# Patient Record
Sex: FEMALE | Race: OTHER | Employment: UNEMPLOYED | URBAN - METROPOLITAN AREA
[De-identification: names, ages, dates, MRNs, and addresses within clinical notes are randomized per-mention and may not be internally consistent; named-entity substitution may affect disease eponyms.]

---

## 2018-03-08 ENCOUNTER — HOSPITAL ENCOUNTER (OUTPATIENT)
Age: 38
Discharge: HOME | End: 2018-03-08
Attending: FAMILY MEDICINE
Payer: COMMERCIAL

## 2018-03-08 VITALS
TEMPERATURE: 98 F | HEART RATE: 83 BPM | RESPIRATION RATE: 18 BRPM | OXYGEN SATURATION: 97 % | WEIGHT: 180 LBS | DIASTOLIC BLOOD PRESSURE: 73 MMHG | HEIGHT: 62 IN | BODY MASS INDEX: 33.13 KG/M2 | SYSTOLIC BLOOD PRESSURE: 131 MMHG

## 2018-03-08 DIAGNOSIS — T78.40XA ALLERGIC REACTION, INITIAL ENCOUNTER: Primary | ICD-10-CM

## 2018-03-08 PROCEDURE — 99203 OFFICE O/P NEW LOW 30 MIN: CPT

## 2018-03-08 PROCEDURE — 99204 OFFICE O/P NEW MOD 45 MIN: CPT

## 2018-03-08 RX ORDER — FEXOFENADINE HCL 180 MG/1
180 TABLET ORAL DAILY
Qty: 7 TABLET | Refills: 0 | Status: SHIPPED | OUTPATIENT
Start: 2018-03-08 | End: 2018-03-15

## 2018-03-08 NOTE — ED PROVIDER NOTES
Patient Seen in: [facility] Immediate Care In [address] [place] [address]    History   Patient presents with:  Rash Skin Problem (integumentary)    Stated Complaint: allergic reaction     HPI    Patient here with slightly red rash over the face. Mildly itchy.   Note Neck supple. No JVD present. Cardiovascular: Normal rate, regular rhythm, normal heart sounds and intact distal pulses. Exam reveals no gallop and no friction rub. No murmur heard. Pulmonary/Chest: Effort normal and breath sounds normal. No stridor.

## 2018-03-08 NOTE — ED INITIAL ASSESSMENT (HPI)
Face became red and itchy since yesterday no new products/make up or creams took nothing for itchiness. Easy non labored resp speech clear.

## 2018-03-08 NOTE — ED NOTES
Avoid new creams soaps perfume use dye free soap. Call and follow up with pcp in office 3 days if worse. Call pcp and follow up in ed for shortness of breath fever increased redness.

## 2018-03-09 ENCOUNTER — OFFICE VISIT (OUTPATIENT)
Dept: FAMILY MEDICINE CLINIC | Facility: CLINIC | Age: 38
End: 2018-03-09

## 2018-03-09 VITALS
HEART RATE: 80 BPM | HEIGHT: 62 IN | SYSTOLIC BLOOD PRESSURE: 109 MMHG | WEIGHT: 185 LBS | DIASTOLIC BLOOD PRESSURE: 72 MMHG | BODY MASS INDEX: 34.04 KG/M2

## 2018-03-09 DIAGNOSIS — T78.40XD ALLERGIC REACTION, SUBSEQUENT ENCOUNTER: Primary | ICD-10-CM

## 2018-03-09 PROCEDURE — 99213 OFFICE O/P EST LOW 20 MIN: CPT | Performed by: NURSE PRACTITIONER

## 2018-03-09 RX ORDER — PREDNISONE 20 MG/1
TABLET ORAL
Qty: 15 TABLET | Refills: 0 | Status: SHIPPED | OUTPATIENT
Start: 2018-03-09 | End: 2018-05-02

## 2018-03-09 RX ORDER — HYDROXYZINE HYDROCHLORIDE 25 MG/1
25 TABLET, FILM COATED ORAL 3 TIMES DAILY PRN
Qty: 15 TABLET | Refills: 0 | Status: SHIPPED | OUTPATIENT
Start: 2018-03-09 | End: 2018-03-14

## 2018-03-09 NOTE — PATIENT INSTRUCTIONS
Reacción alérgica por la picadura de un insecto, generalizada  Usted tiene lili reacción alérgica causada por la picadura de un insecto. [place] puede suceder si lo ha rocio lili avispa, lili [place], lili véspula u otro insecto.  Provoca lili erupción cutánea (sa · La difenhidramina oral es un antihistamínico de venta akiko disponible en farmacias y supermercados. A menos que le hayan recetado un antihistamínico, la difenhidramina puede usarse para reducir la comezón si grandes zonas de la piel están afectadas.  Pue · Tire hacia arriba, utilizando incluso, presión firme. No tire o tuerza la garrapata. Los fluídos del organismo de la garrapata pueden contener organismos causantes de infección. Así que no estruje, aplaste o agujeree el cuerpo de la garrapata.  No utilice · Para prevenir lili infección, no rasque las zonas afectadas. Siempre controle la ashley picada para matthew signos de infección: aumento del enrojecimiento, hinchazón o dolor en la ashley afectada.   Evite futuras [address] ser peor Programe lili visita de control con cabrera proveedor de atención médica o según le indicaron si los síntomas no mejoran.   Llame al 911  Llame al 911 si ocurre algo de lo siguiente:  · Problemas para respirar o tragar, respiración sibilante  · Pile pálida, humed Lana reacción alérgica es un conjunto de síntomas causados por un alérgeno. Ijeoma es lana sustancia que hace reaccionar al sistema inmunitario de [facility] persona.  Cuando karel persona entra en contacto con un alérgeno, se desencadena lana reacción que hace que el cu · Las actividades en las que participó. · Los productos nuevos que usó o las cosas nuevas con las que entró en contacto. Las siguientes son algunas causas comunes, krista recuerde que reji cualquier cosa puede causar lili reacción.  Y es posible que usted ni El objetivo de nuestro tratamiento es [employer] síntomas y que usted se sienta mejor. El sarpullido por lo general va disminuyendo después de unos cuantos días, krista, en ocasiones, puede durar [facility].  En los [address], [address] julee mo · Para ayudar a prevenir lili infección, no se rasque la ashley afectada. Umbarger puede empeorar la reacción y dañar cabrera piel y llevar a lili infección. Examine siempre la ashley afectada para detectar signos de infección.   · Llame a cabrera proveedor de [employer] · Agrandamiento de las zonas de comezón, enrojecimiento o hinchazón  · Náuseas o cólicos estomacales, o dolor abdominal  · Continuidad o repetición de los síntomas  · Expansión de las zonas de enrojecimiento, hinchazón o comezón  · Signos de infección en e · Alimentos: huevos, cacahuates (maníes), [place] de ruby, nueces, soya, pescado, otf, chícharos (arvejas) y mariscos son los más comunes. · Fármacos: penicilina, aspirina, antiinflamatorios no esteroides (CAIO), anestesia.   · Picaduras y mordeduras de in · Retire y lave con [place] las prendas que puedan julee estado en contacto con los aceites de la planta venenosa (u otra sustancia que pueda julee causado la reacción). Limpie cabrera calzado con alcohol para fricciones si tuvo contacto con el alérgeno. Si tiene síntomas de anafilaxis y tiene un autoinyector de epinefrina, úselo inmediatamente y llame al 911. Si no tiene un autoinyector, llame al 911.  Acuéstese con los pies levantados por encima del nivel de cabrera corazón.     Guía de acción [employer]  Los

## 2018-03-12 PROBLEM — T78.40XA ALLERGIC REACTION: Status: ACTIVE | Noted: 2018-03-12

## 2018-03-12 NOTE — PROGRESS NOTES
HPI  Pt presents for f/u UC yesterday for rash on face. No known new exposure to allergen. Was told to take allegra but rash is worsening-is a burning, itchy feeling on face. Is spreading to itchy welts on chest, back and extremities.   States similar occu Take 1 tablet (180 mg total) by mouth daily. Disp: 7 tablet Rfl: 0       Allergies:  No Known Allergies    Physical Exam   Constitutional: She is oriented to person, place, and time. She appears well-developed and well-nourished. No distress.    HENT:   Daya Glover predniSONE 20 MG Oral Tab          Sig: Take 3 tablets once a day for 5 days          Dispense:  15 tablet          Refill:  0      HydrOXYzine HCl 25 MG Oral Tab          Sig: Take 1 tablet (25 mg total) by mouth 3 (three) times daily as needed for [place]

## 2018-03-19 ENCOUNTER — NURSE ONLY (OUTPATIENT)
Dept: ALLERGY | Facility: CLINIC | Age: 38
End: 2018-03-19

## 2018-03-19 ENCOUNTER — OFFICE VISIT (OUTPATIENT)
Dept: ALLERGY | Facility: CLINIC | Age: 38
End: 2018-03-19

## 2018-03-19 VITALS
DIASTOLIC BLOOD PRESSURE: 90 MMHG | OXYGEN SATURATION: 98 % | HEIGHT: 62 IN | HEART RATE: 74 BPM | WEIGHT: 178 LBS | SYSTOLIC BLOOD PRESSURE: 122 MMHG | TEMPERATURE: 99 F | BODY MASS INDEX: 32.76 KG/M2 | RESPIRATION RATE: 16 BRPM

## 2018-03-19 DIAGNOSIS — R21 FACIAL RASH: Primary | ICD-10-CM

## 2018-03-19 DIAGNOSIS — T78.40XA ALLERGIC REACTION, INITIAL ENCOUNTER: ICD-10-CM

## 2018-03-19 PROCEDURE — 99212 OFFICE O/P EST SF 10 MIN: CPT | Performed by: ALLERGY & IMMUNOLOGY

## 2018-03-19 PROCEDURE — 95024 IQ TESTS W/ALLERGENIC XTRCS: CPT | Performed by: ALLERGY & IMMUNOLOGY

## 2018-03-19 PROCEDURE — 95004 PERQ TESTS W/ALRGNC XTRCS: CPT | Performed by: ALLERGY & IMMUNOLOGY

## 2018-03-19 PROCEDURE — 99204 OFFICE O/P NEW MOD 45 MIN: CPT | Performed by: ALLERGY & IMMUNOLOGY

## 2018-03-19 RX ORDER — LEVOCETIRIZINE DIHYDROCHLORIDE 5 MG/1
5 TABLET, FILM COATED ORAL NIGHTLY
Qty: 30 TABLET | Refills: 0 | Status: SHIPPED | OUTPATIENT
Start: 2018-03-19 | End: 2018-05-02

## 2018-03-19 RX ORDER — HYDROXYZINE HYDROCHLORIDE 25 MG/1
25 TABLET, FILM COATED ORAL 3 TIMES DAILY PRN
COMMUNITY
End: 2018-05-02

## 2018-03-19 NOTE — PATIENT INSTRUCTIONS
Recs: Handouts on allergic reactions provided and reviewed  Handouts on allergies and avoidance measures provided and reviewed including the potential treatment option of immunotherapy  Recommend Zyrtec 10 mg or Xyzal 5 mg once a night at bedtime along

## 2018-03-19 NOTE — PROGRESS NOTES
Ryne Lopez is a 40year old female. HPI:   Patient presents with: Allergies: Allergic Reaction. Went to ER 3/8/18. She c/o generalized itching and rash. She also c/o increase mucous production prior to allergic reaction.       Patient is a Allergies:  No Known Allergies      ROS:     Allergic/Immuno:  See HPI  Cardiovascular:  Negative for irregular heartbeat/palpitations, chest pain, edema  Constitutional:  Negative night sweats,weight loss, irritability and lethargy  Endocrine:  Allegra Baum face.  No preceding foods, meds,   No preceding fever or infections    Symptoms resolved with previous treatment with Benadryl Medrol and Atarax    Skin testing today to environmental allergens to screen for allergic triggers was + to mold      Recs: Rosa Maria

## 2018-05-02 ENCOUNTER — OFFICE VISIT (OUTPATIENT)
Dept: FAMILY MEDICINE CLINIC | Facility: CLINIC | Age: 38
End: 2018-05-02

## 2018-05-02 ENCOUNTER — HOSPITAL ENCOUNTER (OUTPATIENT)
Dept: GENERAL RADIOLOGY | Age: 38
Discharge: HOME | End: 2018-05-02
Attending: FAMILY MEDICINE
Payer: COMMERCIAL

## 2018-05-02 VITALS
SYSTOLIC BLOOD PRESSURE: 111 MMHG | WEIGHT: 184 LBS | BODY MASS INDEX: 34 KG/M2 | TEMPERATURE: 98 F | HEART RATE: 73 BPM | DIASTOLIC BLOOD PRESSURE: 75 MMHG

## 2018-05-02 DIAGNOSIS — J30.1 SEASONAL ALLERGIC RHINITIS DUE TO POLLEN: ICD-10-CM

## 2018-05-02 DIAGNOSIS — M25.562 ACUTE PAIN OF LEFT KNEE: Primary | ICD-10-CM

## 2018-05-02 DIAGNOSIS — M25.562 ACUTE PAIN OF LEFT KNEE: ICD-10-CM

## 2018-05-02 PROCEDURE — 73564 X-RAY EXAM KNEE 4 OR MORE: CPT | Performed by: FAMILY MEDICINE

## 2018-05-02 PROCEDURE — 99214 OFFICE O/P EST MOD 30 MIN: CPT | Performed by: FAMILY MEDICINE

## 2018-05-02 PROCEDURE — 99212 OFFICE O/P EST SF 10 MIN: CPT | Performed by: FAMILY MEDICINE

## 2018-05-02 RX ORDER — NAPROXEN SODIUM 550 MG/1
550 TABLET ORAL 2 TIMES DAILY WITH MEALS
Qty: 60 TABLET | Refills: 1 | Status: SHIPPED | OUTPATIENT
Start: 2018-05-02 | End: 2019-01-23

## 2018-05-02 RX ORDER — FLUTICASONE PROPIONATE 50 MCG
2 SPRAY, SUSPENSION (ML) NASAL DAILY
Qty: 1 INHALER | Refills: 3 | Status: SHIPPED | OUTPATIENT
Start: 2018-05-02 | End: 2019-01-23

## 2018-05-02 NOTE — PROGRESS NOTES
5/2/2018  11:53 AM    Yessenia Chatterjee is a 40year old female.     Chief complaint(s): Patient presents with:  Knee Pain: pt c/o left knee pain X 2 week  Leg Pain: pt c/o left leg pain X 2 weeks    HPI:     Yessenia Chatterjee primary complaint is reg Allergies      ROS:   Review of Systems   Constitutional: Negative for chills, fatigue and fever. HENT: Positive for rhinorrhea, sinus pressure and sneezing. Negative for ear pain and sore throat. Respiratory: Negative for cough and wheezing.     Katie Schaefer Fluticasone Propionate 50 MCG/ACT Nasal Suspension 1 Inhaler 3      Si sprays by Each Nare route daily.          REFERRALS: 1PHYSICAL THERAPY - INTERNAL,       PHYSICAL THERAPY - at [address] [place] [address], COMPLETE (4 OR MORE VIEWS), LEFT (CPT=7

## 2018-11-19 ENCOUNTER — APPOINTMENT (OUTPATIENT)
Dept: GENERAL RADIOLOGY | Age: 38
End: 2018-11-19
Attending: EMERGENCY MEDICINE
Payer: COMMERCIAL

## 2018-11-19 ENCOUNTER — HOSPITAL ENCOUNTER (OUTPATIENT)
Age: 38
Discharge: HOME | End: 2018-11-19
Attending: EMERGENCY MEDICINE
Payer: COMMERCIAL

## 2018-11-19 VITALS
WEIGHT: 188 LBS | SYSTOLIC BLOOD PRESSURE: 116 MMHG | DIASTOLIC BLOOD PRESSURE: 73 MMHG | BODY MASS INDEX: 34 KG/M2 | TEMPERATURE: 98 F | OXYGEN SATURATION: 100 % | RESPIRATION RATE: 16 BRPM | HEART RATE: 76 BPM

## 2018-11-19 DIAGNOSIS — M79.601 PAIN OF RIGHT UPPER EXTREMITY: ICD-10-CM

## 2018-11-19 DIAGNOSIS — N64.4 BREAST PAIN: Primary | ICD-10-CM

## 2018-11-19 PROCEDURE — 93005 ELECTROCARDIOGRAM TRACING: CPT

## 2018-11-19 PROCEDURE — 93010 ELECTROCARDIOGRAM REPORT: CPT | Performed by: EMERGENCY MEDICINE

## 2018-11-19 PROCEDURE — 93010 ELECTROCARDIOGRAM REPORT: CPT

## 2018-11-19 PROCEDURE — 99214 OFFICE O/P EST MOD 30 MIN: CPT

## 2018-11-19 PROCEDURE — 71046 X-RAY EXAM CHEST 2 VIEWS: CPT | Performed by: EMERGENCY MEDICINE

## 2018-11-19 NOTE — ED INITIAL ASSESSMENT (HPI)
Pt reports bilateral breast pain and bilateral leg pain x one week. Denies chest pain or shortness of breath.

## 2018-11-19 NOTE — ED PROVIDER NOTES
Patient Seen in: [facility] Immediate Care In [address] [place] [address]    History   Patient presents with:  Pain (neurologic)    Stated Complaint: breast pain    HPI    44 yo female with pain in both breasts. Pain worse with palpation. No difficulty breathing.  No She exhibits no distension and no mass. There is no tenderness. There is no rebound and no guarding. Musculoskeletal: Normal range of motion. She exhibits no edema or tenderness. Lymphadenopathy:     She has no cervical adenopathy.    Neurological: She

## 2018-11-26 ENCOUNTER — APPOINTMENT (OUTPATIENT)
Dept: LAB | Age: 38
End: 2018-11-26
Attending: FAMILY MEDICINE
Payer: COMMERCIAL

## 2018-11-26 ENCOUNTER — OFFICE VISIT (OUTPATIENT)
Dept: FAMILY MEDICINE CLINIC | Facility: CLINIC | Age: 38
End: 2018-11-26

## 2018-11-26 VITALS
DIASTOLIC BLOOD PRESSURE: 76 MMHG | BODY MASS INDEX: 36.98 KG/M2 | HEART RATE: 103 BPM | HEIGHT: 60 IN | SYSTOLIC BLOOD PRESSURE: 107 MMHG | WEIGHT: 188.38 LBS

## 2018-11-26 DIAGNOSIS — M79.10 MYALGIA: ICD-10-CM

## 2018-11-26 DIAGNOSIS — R20.2 PARESTHESIA: ICD-10-CM

## 2018-11-26 DIAGNOSIS — M79.10 MYALGIA: Primary | ICD-10-CM

## 2018-11-26 PROCEDURE — 99212 OFFICE O/P EST SF 10 MIN: CPT | Performed by: FAMILY MEDICINE

## 2018-11-26 PROCEDURE — 99214 OFFICE O/P EST MOD 30 MIN: CPT | Performed by: FAMILY MEDICINE

## 2018-11-26 PROCEDURE — 86431 RHEUMATOID FACTOR QUANT: CPT | Performed by: FAMILY MEDICINE

## 2018-11-26 PROCEDURE — 86038 ANTINUCLEAR ANTIBODIES: CPT

## 2018-11-26 PROCEDURE — 86140 C-REACTIVE PROTEIN: CPT | Performed by: FAMILY MEDICINE

## 2018-11-26 PROCEDURE — 85652 RBC SED RATE AUTOMATED: CPT | Performed by: FAMILY MEDICINE

## 2018-11-26 PROCEDURE — 36415 COLL VENOUS BLD VENIPUNCTURE: CPT

## 2018-11-26 RX ORDER — MELOXICAM 15 MG/1
15 TABLET ORAL DAILY
Qty: 90 TABLET | Refills: 1 | Status: SHIPPED | OUTPATIENT
Start: 2018-11-26 | End: 2019-08-13 | Stop reason: ALTCHOICE

## 2018-11-26 NOTE — PROGRESS NOTES
11/26/2018  1:09 PM    Alia Cummings is a 45year old female. Chief complaint(s): Patient presents with:  Pain: Pain bilateral arms, legs, and breasts.    Test Results: Chest xray    HPI:     Alia Cummings primary complaint is regarding as pain and sore throat. Respiratory: Negative for cough and wheezing. Cardiovascular: Negative for chest pain and palpitations. Gastrointestinal: Negative for nausea, vomiting, abdominal pain, diarrhea and constipation.    Musculoskeletal: Positive fo 11/19/2018 at 13:35             ASSESSMENT/PLAN:   Assessment   Myalgia  (primary encounter diagnosis)  Paresthesia    DDX:  OA, SLE, RA, CTS, Fibromyalgia    MEDICATIONS:  •  Meloxicam 15 MG Oral Tab, Take 1 tablet (15 mg total) by mouth daily. , Disp: 90

## 2019-01-23 ENCOUNTER — LAB ENCOUNTER (OUTPATIENT)
Dept: LAB | Age: 39
End: 2019-01-23
Attending: FAMILY MEDICINE
Payer: COMMERCIAL

## 2019-01-23 ENCOUNTER — APPOINTMENT (OUTPATIENT)
Dept: LAB | Age: 39
End: 2019-01-23
Attending: FAMILY MEDICINE
Payer: COMMERCIAL

## 2019-01-23 ENCOUNTER — OFFICE VISIT (OUTPATIENT)
Dept: FAMILY MEDICINE CLINIC | Facility: CLINIC | Age: 39
End: 2019-01-23

## 2019-01-23 VITALS
BODY MASS INDEX: 37.6 KG/M2 | HEIGHT: 59.5 IN | SYSTOLIC BLOOD PRESSURE: 109 MMHG | WEIGHT: 189 LBS | DIASTOLIC BLOOD PRESSURE: 72 MMHG | HEART RATE: 68 BPM | TEMPERATURE: 98 F

## 2019-01-23 DIAGNOSIS — D25.9 UTERINE LEIOMYOMA, UNSPECIFIED LOCATION: ICD-10-CM

## 2019-01-23 DIAGNOSIS — Z00.00 PHYSICAL EXAM: ICD-10-CM

## 2019-01-23 DIAGNOSIS — N63.0 BREAST MASS IN FEMALE: ICD-10-CM

## 2019-01-23 DIAGNOSIS — Z00.00 PHYSICAL EXAM: Primary | ICD-10-CM

## 2019-01-23 LAB
ALBUMIN SERPL BCP-MCNC: 3.8 G/DL (ref 3.5–4.8)
ALBUMIN/GLOB SERPL: 1.3 {RATIO} (ref 1–2)
ALP SERPL-CCNC: 68 U/L (ref 32–100)
ALT SERPL-CCNC: 37 U/L (ref 14–54)
ANION GAP SERPL CALC-SCNC: 9 MMOL/L (ref 0–18)
AST SERPL-CCNC: 25 U/L (ref 15–41)
BACTERIA UR QL AUTO: NEGATIVE /HPF
BACTERIA UR QL AUTO: NEGATIVE /HPF
BASOPHILS # BLD: 0.1 K/UL (ref 0–0.2)
BASOPHILS NFR BLD: 1 %
BILIRUB SERPL-MCNC: 0.4 MG/DL (ref 0.3–1.2)
BILIRUB UR QL: NEGATIVE
BUN SERPL-MCNC: 9 MG/DL (ref 8–20)
BUN/CREAT SERPL: 15.3 (ref 10–20)
CALCIUM SERPL-MCNC: 9.2 MG/DL (ref 8.5–10.5)
CANCER AG125 SERPL-ACNC: 10 U/ML (ref 0–35)
CANCER AG125 SERPL-ACNC: 7.1 U/ML (ref ?–35)
CHLORIDE SERPL-SCNC: 103 MMOL/L (ref 95–110)
CHOLEST SERPL-MCNC: 139 MG/DL (ref 110–200)
CLARITY UR: CLEAR
CO2 SERPL-SCNC: 23 MMOL/L (ref 22–32)
COLOR UR: YELLOW
CREAT SERPL-MCNC: 0.59 MG/DL (ref 0.5–1.5)
EOSINOPHIL # BLD: 0.2 K/UL (ref 0–0.7)
EOSINOPHIL NFR BLD: 2 %
ERYTHROCYTE [DISTWIDTH] IN BLOOD BY AUTOMATED COUNT: 14.5 % (ref 11–15)
EST. AVERAGE GLUCOSE BLD GHB EST-MCNC: 131 MG/DL (ref 68–126)
GLOBULIN PLAS-MCNC: 3 G/DL (ref 2.5–3.7)
GLUCOSE SERPL-MCNC: 106 MG/DL (ref 70–99)
GLUCOSE UR-MCNC: NEGATIVE MG/DL
HBA1C MFR BLD HPLC: 6.2 % (ref ?–5.7)
HCT VFR BLD AUTO: 39.3 % (ref 35–48)
HDLC SERPL-MCNC: 32 MG/DL
HGB BLD-MCNC: 12.7 G/DL (ref 12–16)
KETONES UR-MCNC: NEGATIVE MG/DL
LDLC SERPL CALC-MCNC: 82 MG/DL (ref 0–99)
LEUKOCYTE ESTERASE UR QL STRIP.AUTO: NEGATIVE
LYMPHOCYTES # BLD: 2.6 K/UL (ref 1–4)
LYMPHOCYTES NFR BLD: 25 %
MCH RBC QN AUTO: 24.3 PG (ref 27–32)
MCHC RBC AUTO-ENTMCNC: 32.2 G/DL (ref 32–37)
MCV RBC AUTO: 75.4 FL (ref 80–100)
MONOCYTES # BLD: 0.5 K/UL (ref 0–1)
MONOCYTES NFR BLD: 5 %
NEUTROPHILS # BLD AUTO: 7.2 K/UL (ref 1.8–7.7)
NEUTROPHILS NFR BLD: 68 %
NITRITE UR QL STRIP.AUTO: NEGATIVE
NONHDLC SERPL-MCNC: 107 MG/DL
OSMOLALITY UR CALC.SUM OF ELEC: 279 MOSM/KG (ref 275–295)
PATIENT FASTING: YES
PH UR: 5 [PH] (ref 5–8)
PLATELET # BLD AUTO: 323 K/UL (ref 140–400)
PMV BLD AUTO: 9.1 FL (ref 7.4–10.3)
POTASSIUM SERPL-SCNC: 4.3 MMOL/L (ref 3.3–5.1)
PROT SERPL-MCNC: 6.8 G/DL (ref 5.9–8.4)
PROT UR-MCNC: NEGATIVE MG/DL
RBC # BLD AUTO: 5.22 M/UL (ref 3.7–5.4)
RBC #/AREA URNS AUTO: 0 /HPF
RBC #/AREA URNS AUTO: 1 /HPF
SODIUM SERPL-SCNC: 135 MMOL/L (ref 136–144)
SP GR UR STRIP: 1.02 (ref 1–1.03)
TRIGL SERPL-MCNC: 123 MG/DL (ref 1–149)
TSH SERPL-ACNC: 2.44 UIU/ML (ref 0.45–5.33)
UROBILINOGEN UR STRIP-ACNC: <2
VIT C UR-MCNC: NEGATIVE MG/DL
WBC # BLD AUTO: 10.6 K/UL (ref 4–11)
WBC #/AREA URNS AUTO: 1 /HPF
WBC #/AREA URNS AUTO: <1 /HPF

## 2019-01-23 PROCEDURE — 86304 IMMUNOASSAY TUMOR CA 125: CPT

## 2019-01-23 PROCEDURE — 83036 HEMOGLOBIN GLYCOSYLATED A1C: CPT

## 2019-01-23 PROCEDURE — 81001 URINALYSIS AUTO W/SCOPE: CPT | Performed by: FAMILY MEDICINE

## 2019-01-23 PROCEDURE — 90471 IMMUNIZATION ADMIN: CPT | Performed by: FAMILY MEDICINE

## 2019-01-23 PROCEDURE — 84443 ASSAY THYROID STIM HORMONE: CPT

## 2019-01-23 PROCEDURE — 36415 COLL VENOUS BLD VENIPUNCTURE: CPT

## 2019-01-23 PROCEDURE — 90715 TDAP VACCINE 7 YRS/> IM: CPT | Performed by: FAMILY MEDICINE

## 2019-01-23 PROCEDURE — 82306 VITAMIN D 25 HYDROXY: CPT | Performed by: FAMILY MEDICINE

## 2019-01-23 PROCEDURE — 85025 COMPLETE CBC W/AUTO DIFF WBC: CPT

## 2019-01-23 PROCEDURE — 80053 COMPREHEN METABOLIC PANEL: CPT

## 2019-01-23 PROCEDURE — 93005 ELECTROCARDIOGRAM TRACING: CPT

## 2019-01-23 PROCEDURE — 93010 ELECTROCARDIOGRAM REPORT: CPT | Performed by: FAMILY MEDICINE

## 2019-01-23 PROCEDURE — 81015 MICROSCOPIC EXAM OF URINE: CPT | Performed by: FAMILY MEDICINE

## 2019-01-23 PROCEDURE — 80061 LIPID PANEL: CPT

## 2019-01-23 PROCEDURE — 99395 PREV VISIT EST AGE 18-39: CPT | Performed by: FAMILY MEDICINE

## 2019-01-23 NOTE — PROGRESS NOTES
1/23/2019  8:32 AM    Parish Pedraza is a 45year old female. Chief complaint(s): Patient presents with:  Routine Physical    HPI:     Parish Pedraza primary complaint is regarding CPE.      Parish Pedraza is a 45year old female presen tinnitus. Eyes: Negative for visual disturbance. Respiratory: Negative for apnea, cough, chest tightness, shortness of breath and wheezing. Cardiovascular: Negative for chest pain, palpitations and leg swelling.    Gastrointestinal: Negative for hea change. Lungs clear to auscultation bilaterally. Abdominal: Soft. Genitourinary: Uterus is enlarged. Genitourinary Comments: GYN pelvic exam: Normal external genitalia, without lesions or condyloma.   There is no vaginal discharge , cervix with no m TDAP.    RECOMMENDATIONS given include: ANTICIPATORY GUIDANCE  topics covered today include: safety (i.e. seat belts, helmets, sunscreen, protective sports gear ), nutrition (i.e. healthy meals and snacks (i.e. avoid junk food and high-carbohydrate foods); YEARS, IM USE  US PELVIS (TRANSABDOMINAL PELVIS)  ([id number])         Madhuri Donohue MD

## 2019-01-24 LAB
C TRACH DNA SPEC QL NAA+PROBE: NEGATIVE
N GONORRHOEA DNA SPEC QL NAA+PROBE: NEGATIVE

## 2019-01-25 LAB
25(OH)D3 SERPL-MCNC: 23.3 NG/ML (ref 30–100)
HPV I/H RISK 1 DNA SPEC QL NAA+PROBE: NEGATIVE

## 2019-01-25 RX ORDER — ERGOCALCIFEROL 1.25 MG/1
50000 CAPSULE ORAL WEEKLY
Qty: 12 CAPSULE | Refills: 4 | Status: SHIPPED | OUTPATIENT
Start: 2019-01-25 | End: 2019-02-24

## 2019-03-26 ENCOUNTER — HOSPITAL ENCOUNTER (OUTPATIENT)
Dept: ULTRASOUND IMAGING | Age: 39
Discharge: HOME | End: 2019-03-26
Attending: FAMILY MEDICINE
Payer: COMMERCIAL

## 2019-03-26 DIAGNOSIS — D25.9 UTERINE LEIOMYOMA, UNSPECIFIED LOCATION: Primary | ICD-10-CM

## 2019-03-26 DIAGNOSIS — D25.9 UTERINE LEIOMYOMA, UNSPECIFIED LOCATION: ICD-10-CM

## 2019-03-26 PROCEDURE — 76856 US EXAM PELVIC COMPLETE: CPT | Performed by: FAMILY MEDICINE

## 2019-03-26 PROCEDURE — 76830 TRANSVAGINAL US NON-OB: CPT | Performed by: FAMILY MEDICINE

## 2019-05-02 ENCOUNTER — OFFICE VISIT (OUTPATIENT)
Dept: OBGYN CLINIC | Facility: CLINIC | Age: 39
End: 2019-05-02

## 2019-05-02 VITALS
HEIGHT: 59.5 IN | BODY MASS INDEX: 38.2 KG/M2 | DIASTOLIC BLOOD PRESSURE: 68 MMHG | SYSTOLIC BLOOD PRESSURE: 112 MMHG | WEIGHT: 192 LBS

## 2019-05-02 DIAGNOSIS — N89.8 VAGINAL DISCHARGE: Primary | ICD-10-CM

## 2019-05-02 DIAGNOSIS — Z11.3 SCREENING EXAMINATION FOR STD (SEXUALLY TRANSMITTED DISEASE): ICD-10-CM

## 2019-05-02 DIAGNOSIS — N84.0 ENDOMETRIAL POLYP: ICD-10-CM

## 2019-05-02 DIAGNOSIS — N64.4 MASTALGIA: ICD-10-CM

## 2019-05-02 DIAGNOSIS — N92.6 IRREGULAR MENSES: ICD-10-CM

## 2019-05-02 PROCEDURE — 99244 OFF/OP CNSLTJ NEW/EST MOD 40: CPT | Performed by: OBSTETRICS & GYNECOLOGY

## 2019-05-02 RX ORDER — ERGOCALCIFEROL 1.25 MG/1
CAPSULE ORAL
Refills: 4 | COMMUNITY
Start: 2019-04-23 | End: 2020-10-29 | Stop reason: ALTCHOICE

## 2019-05-02 NOTE — PROGRESS NOTES
HPI:   Deisy Barboza is a 45year old female who presents for a hx of vaginal itching and foul vaginal discharge.    Hx of pelvic u/s mar 26 2019 at [facility], poss polyp or submucosal fibroid with repeat u/s advised by radiology, u/s was done for [facility] exertion  CARDIOVASCULAR: denies chest pain on exertion  GI: denies abdominal pain,denies heartburn  : denies dysuria, vaginal discharge or itching,periods regular   MUSCULOSKELETAL: denies back pain  NEURO: denies headaches  PSYCHE: denies depression or F/u office 1-2 weeks. Pt in exam room/seen/cousneled for approx 40 min, greater than 50 percent of time spent in face to face counseling. . Self breast exam explained. Health maintenancePt' s weight is Body mass index is 38.13 kg/m². , recommended

## 2019-05-08 ENCOUNTER — TELEPHONE (OUTPATIENT)
Dept: OBGYN CLINIC | Facility: CLINIC | Age: 39
End: 2019-05-08

## 2019-05-08 PROBLEM — N64.4 MASTALGIA: Status: ACTIVE | Noted: 2019-05-08

## 2019-05-08 PROBLEM — N84.0 ENDOMETRIAL POLYP: Status: ACTIVE | Noted: 2019-05-08

## 2019-05-08 NOTE — TELEPHONE ENCOUNTER
Please inform   Diagnostic mammogram ( for mastalgia) order and pelvic ultrasound (for hx of possible endometrial polyps)  In the computer,  Pt to make appointment for both on the same day,  F/u in office 1 week. Please help pt schedule these.

## 2019-05-13 ENCOUNTER — TELEPHONE (OUTPATIENT)
Dept: OBGYN CLINIC | Facility: CLINIC | Age: 39
End: 2019-05-13

## 2019-05-13 NOTE — TELEPHONE ENCOUNTER
----- Message from Tavo Palacios MD sent at 5/10/2019  9:56 PM CDT -----  Normal genital screen and normal gc/chl

## 2019-05-21 ENCOUNTER — HOSPITAL ENCOUNTER (OUTPATIENT)
Dept: ULTRASOUND IMAGING | Facility: HOSPITAL | Age: 39
Discharge: HOME | End: 2019-05-21
Attending: OBSTETRICS & GYNECOLOGY
Payer: COMMERCIAL

## 2019-05-21 ENCOUNTER — HOSPITAL ENCOUNTER (OUTPATIENT)
Dept: MAMMOGRAPHY | Facility: HOSPITAL | Age: 39
Discharge: HOME | End: 2019-05-21
Attending: OBSTETRICS & GYNECOLOGY
Payer: COMMERCIAL

## 2019-05-21 DIAGNOSIS — N64.4 MASTALGIA: ICD-10-CM

## 2019-05-21 DIAGNOSIS — N84.0 ENDOMETRIAL POLYP: ICD-10-CM

## 2019-05-21 PROCEDURE — 76856 US EXAM PELVIC COMPLETE: CPT | Performed by: OBSTETRICS & GYNECOLOGY

## 2019-05-21 PROCEDURE — 77066 DX MAMMO INCL CAD BI: CPT | Performed by: OBSTETRICS & GYNECOLOGY

## 2019-05-21 PROCEDURE — 77062 BREAST TOMOSYNTHESIS BI: CPT | Performed by: OBSTETRICS & GYNECOLOGY

## 2019-05-21 PROCEDURE — 76642 ULTRASOUND BREAST LIMITED: CPT | Performed by: OBSTETRICS & GYNECOLOGY

## 2019-05-21 PROCEDURE — 76830 TRANSVAGINAL US NON-OB: CPT | Performed by: OBSTETRICS & GYNECOLOGY

## 2019-05-28 ENCOUNTER — TELEPHONE (OUTPATIENT)
Dept: OBGYN CLINIC | Facility: CLINIC | Age: 39
End: 2019-05-28

## 2019-05-28 NOTE — TELEPHONE ENCOUNTER
Call was picked up but no answer       ----- Message from Jerome Frost MD sent at 5/28/2019  9:59 AM CDT -----  Benign findings/multiple bilateral breast cysts noted, hx of mastodynia,  Please inform and schedule f/u exam 1 week for further clinical

## 2019-05-29 ENCOUNTER — TELEPHONE (OUTPATIENT)
Dept: OBGYN CLINIC | Facility: CLINIC | Age: 39
End: 2019-05-29

## 2019-05-29 NOTE — TELEPHONE ENCOUNTER
Pt has follow up visit scheduled for 05/30/19 at 12:40 pm with asj--    --- Message from Fernie Ayala MD sent at 5/28/2019  9:59 AM CDT -----  Benign findings/multiple bilateral breast cysts noted, hx of mastodynia,  Please inform and schedule f/u e

## 2019-05-30 ENCOUNTER — OFFICE VISIT (OUTPATIENT)
Dept: OBGYN CLINIC | Facility: CLINIC | Age: 39
End: 2019-05-30

## 2019-05-30 VITALS — SYSTOLIC BLOOD PRESSURE: 110 MMHG | BODY MASS INDEX: 38 KG/M2 | DIASTOLIC BLOOD PRESSURE: 64 MMHG | WEIGHT: 192 LBS

## 2019-05-30 DIAGNOSIS — N60.01 BILATERAL BREAST CYSTS: ICD-10-CM

## 2019-05-30 DIAGNOSIS — N64.4 MASTALGIA IN FEMALE: Primary | ICD-10-CM

## 2019-05-30 DIAGNOSIS — N64.4 MASTODYNIA: ICD-10-CM

## 2019-05-30 DIAGNOSIS — N60.02 BILATERAL BREAST CYSTS: ICD-10-CM

## 2019-05-30 PROCEDURE — 99214 OFFICE O/P EST MOD 30 MIN: CPT | Performed by: OBSTETRICS & GYNECOLOGY

## 2019-05-30 NOTE — PROGRESS NOTES
HPI:   Ryne Lopez is a 45year old female who presents for a f/u exam for mastalgia and for recent pelvic u/s needing f/u. Pt referred to dr Ligia Quarles for her bilateral breast cysts and mastalgia,  Pt given number and will call his office today. hx of anemia  ENDOCRINE: denies thyroid history  ALL/ASTHMA: denies hx of allergy or asthma    EXAM:   /64   Wt 192 lb (87.1 kg)   LMP 05/03/2019   BMI 38.13 kg/m²   Body mass index is 38.13 kg/m².    GENERAL: well developed, well nourished,in no appa

## 2019-06-02 ENCOUNTER — APPOINTMENT (OUTPATIENT)
Dept: GENERAL RADIOLOGY | Facility: HOSPITAL | Age: 39
End: 2019-06-02
Attending: EMERGENCY MEDICINE
Payer: COMMERCIAL

## 2019-06-02 ENCOUNTER — HOSPITAL ENCOUNTER (EMERGENCY)
Facility: HOSPITAL | Age: 39
Discharge: HOME | End: 2019-06-02
Attending: EMERGENCY MEDICINE
Payer: COMMERCIAL

## 2019-06-02 VITALS
TEMPERATURE: 98 F | WEIGHT: 192 LBS | RESPIRATION RATE: 16 BRPM | OXYGEN SATURATION: 98 % | BODY MASS INDEX: 38 KG/M2 | HEART RATE: 80 BPM | DIASTOLIC BLOOD PRESSURE: 71 MMHG | SYSTOLIC BLOOD PRESSURE: 117 MMHG

## 2019-06-02 DIAGNOSIS — R20.2 PARESTHESIA OF LEFT ARM: Primary | ICD-10-CM

## 2019-06-02 PROCEDURE — 99283 EMERGENCY DEPT VISIT LOW MDM: CPT

## 2019-06-02 PROCEDURE — 93005 ELECTROCARDIOGRAM TRACING: CPT

## 2019-06-02 PROCEDURE — 71046 X-RAY EXAM CHEST 2 VIEWS: CPT | Performed by: EMERGENCY MEDICINE

## 2019-06-02 PROCEDURE — 93010 ELECTROCARDIOGRAM REPORT: CPT | Performed by: EMERGENCY MEDICINE

## 2019-06-03 NOTE — ED PROVIDER NOTES
Patient Seen in: [facility] Emergency Department    History   Patient presents with:  Numbness Weakness (neurologic)    Stated Complaint: left arm pain    HPI    Patient is a 43-year-old female who presents to the emergency department with a chief Left wrist: She exhibits no effusion. Arms:  Neurological: She is alert and oriented to person, place, and time. She has normal strength. No sensory deficit. Skin: Skin is warm and dry. No rash noted. Nursing note and vitals reviewed.

## 2019-06-06 PROBLEM — N60.01 BILATERAL BREAST CYSTS: Status: ACTIVE | Noted: 2019-06-06

## 2019-06-06 PROBLEM — N60.02 BILATERAL BREAST CYSTS: Status: ACTIVE | Noted: 2019-06-06

## 2019-06-18 ENCOUNTER — TELEPHONE (OUTPATIENT)
Dept: FAMILY MEDICINE CLINIC | Facility: CLINIC | Age: 39
End: 2019-06-18

## 2019-07-15 ENCOUNTER — TELEPHONE (OUTPATIENT)
Dept: FAMILY MEDICINE CLINIC | Facility: CLINIC | Age: 39
End: 2019-07-15

## 2019-08-13 ENCOUNTER — OFFICE VISIT (OUTPATIENT)
Dept: FAMILY MEDICINE CLINIC | Facility: CLINIC | Age: 39
End: 2019-08-13

## 2019-08-13 VITALS
SYSTOLIC BLOOD PRESSURE: 98 MMHG | HEIGHT: 60 IN | BODY MASS INDEX: 36.64 KG/M2 | DIASTOLIC BLOOD PRESSURE: 66 MMHG | HEART RATE: 67 BPM | TEMPERATURE: 98 F | WEIGHT: 186.63 LBS

## 2019-08-13 DIAGNOSIS — J02.9 SORE THROAT: Primary | ICD-10-CM

## 2019-08-13 LAB
CONTROL LINE PRESENT WITH A CLEAR BACKGROUND (YES/NO): YES YES/NO
KIT LOT #: NORMAL NUMERIC
STREP GRP A CUL-SCR: NEGATIVE

## 2019-08-13 PROCEDURE — 87880 STREP A ASSAY W/OPTIC: CPT | Performed by: FAMILY MEDICINE

## 2019-08-13 PROCEDURE — 99213 OFFICE O/P EST LOW 20 MIN: CPT | Performed by: FAMILY MEDICINE

## 2019-08-13 RX ORDER — IBUPROFEN 600 MG/1
600 TABLET ORAL EVERY 6 HOURS PRN
Qty: 60 TABLET | Refills: 0 | Status: SHIPPED | OUTPATIENT
Start: 2019-08-13 | End: 2019-08-13

## 2019-08-13 RX ORDER — METAXALONE 800 MG/1
800 TABLET ORAL 3 TIMES DAILY
Qty: 30 TABLET | Refills: 1 | Status: SHIPPED | OUTPATIENT
Start: 2019-08-13 | End: 2019-08-13

## 2019-08-13 NOTE — PROGRESS NOTES
2019 3:58 PM    Yessenia Chatterjee, : 1980  Patient presents with:  Sore Throat: c/o trouble swallowing x 1 month  Vision Problem: c/o trouble adjusting to light when stepping outdoors    HPI:     Yessenia Chatterjee is a 44year old female Laterality Date   •           • TUBAL LIGATION         Social History: Social History    Socioeconomic History      Marital status:       Spouse name: Not on file      Number of children: Not on file      Years of education: Not on Exam:     Physical Examination:    BP 98/66 (BP Location: Right arm, Patient Position: Sitting, Cuff Size: large)   Pulse 67   Temp 97.8 °F (36.6 °C) (Oral)   Ht 5' (1.524 m)   Wt 186 lb 9.6 oz (84.6 kg)   LMP 08/11/2019 (LMP Unknown)   BMI 36.44 kg/m² [84031]      Follow Up with:  No follow-up provider specified. Beltran Woodruff MD    All results reviewed and discussed with patient. See AVS for detailed discharge instructions provided for today's visit / condition .

## 2020-01-10 ENCOUNTER — OFFICE VISIT (OUTPATIENT)
Dept: FAMILY MEDICINE CLINIC | Facility: CLINIC | Age: 40
End: 2020-01-10

## 2020-01-10 VITALS
TEMPERATURE: 98 F | WEIGHT: 190 LBS | DIASTOLIC BLOOD PRESSURE: 77 MMHG | HEIGHT: 60 IN | HEART RATE: 76 BPM | SYSTOLIC BLOOD PRESSURE: 113 MMHG | BODY MASS INDEX: 37.3 KG/M2

## 2020-01-10 DIAGNOSIS — G58.9 MONONEUROPATHY: Primary | ICD-10-CM

## 2020-01-10 DIAGNOSIS — R51.9 HEADACHE DISORDER: ICD-10-CM

## 2020-01-10 PROCEDURE — 99213 OFFICE O/P EST LOW 20 MIN: CPT | Performed by: FAMILY MEDICINE

## 2020-01-10 RX ORDER — MOMETASONE FUROATE 1 MG/G
CREAM TOPICAL
Qty: 30 G | Refills: 1 | Status: SHIPPED | OUTPATIENT
Start: 2020-01-10 | End: 2020-10-29 | Stop reason: ALTCHOICE

## 2020-01-10 RX ORDER — IBUPROFEN 600 MG/1
600 TABLET ORAL EVERY 6 HOURS PRN
Qty: 60 TABLET | Refills: 0 | Status: SHIPPED | OUTPATIENT
Start: 2020-01-10 | End: 2021-01-21

## 2020-01-10 RX ORDER — L-METHYLFOLATE-ALGAE-VIT B12-B6 CAP 3-90.314-2-35 MG 3-90.314-2-35 MG
2 CAP ORAL DAILY
Qty: 180 CAPSULE | Refills: 3 | Status: SHIPPED | OUTPATIENT
Start: 2020-01-10 | End: 2020-02-09

## 2020-01-10 NOTE — PROGRESS NOTES
1/10/2020  11:56 AM    Parish Pedraza is a 44year old female.     Chief complaint(s): Patient presents with:  Headache: right side of head, fatigue and drowsy   Derm Problem: c/o dark red spots on her lower legs     HPI:     Parish Pedraza prim capsules by mouth daily. 180 capsule 3   • Mometasone Furoate 0.1 % External Cream Apply to affected area(s) BID 30 g 1   • ibuprofen 600 MG Oral Tab Take 1 tablet (600 mg total) by mouth every 6 (six) hours as needed for Pain. Always take it with food.  [facility] Prescriptions     Signed Prescriptions Disp Refills   • L-Methylfolate-Algae-B12-B6 (METANX) 4-93.275-0-01 MG Oral Cap 180 capsule 3     Sig: Take 2 capsules by mouth daily.    • Mometasone Furoate 0.1 % External Cream 30 g 1     Sig: Apply to affected area Jose Aguiar MD

## 2020-01-15 ENCOUNTER — TELEPHONE (OUTPATIENT)
Dept: FAMILY MEDICINE CLINIC | Facility: CLINIC | Age: 40
End: 2020-01-15

## 2020-01-15 RX ORDER — AZITHROMYCIN 250 MG/1
TABLET, FILM COATED ORAL
Qty: 6 TABLET | Refills: 0 | Status: SHIPPED | OUTPATIENT
Start: 2020-01-15 | End: 2020-01-24 | Stop reason: ALTCHOICE

## 2020-01-15 NOTE — TELEPHONE ENCOUNTER
Language Line # [phone]     Spoke with patient--report headache of 10/10 \"all over my whole head--it's stronger now. My eyes hurt and I can't stand it. \" Patient denies nausea, vomiting or dizziness at this time.     Contacted Dr. Camryn Cao for recommendatio

## 2020-01-15 NOTE — TELEPHONE ENCOUNTER
Pharmacy     [address] [place] [address], [address], [phone], [phone]   Outpatient Medication Detail      Disp Refills Start End    azithromycin (ZITHROMAX Z-ELIZABETH) 250 MG Oral Tab 6 tablet 0

## 2020-01-24 ENCOUNTER — LAB ENCOUNTER (OUTPATIENT)
Dept: LAB | Age: 40
End: 2020-01-24
Attending: FAMILY MEDICINE
Payer: COMMERCIAL

## 2020-01-24 ENCOUNTER — OFFICE VISIT (OUTPATIENT)
Dept: FAMILY MEDICINE CLINIC | Facility: CLINIC | Age: 40
End: 2020-01-24

## 2020-01-24 VITALS
HEIGHT: 60 IN | TEMPERATURE: 99 F | DIASTOLIC BLOOD PRESSURE: 69 MMHG | WEIGHT: 188 LBS | BODY MASS INDEX: 36.91 KG/M2 | HEART RATE: 61 BPM | SYSTOLIC BLOOD PRESSURE: 109 MMHG

## 2020-01-24 DIAGNOSIS — Z00.00 PHYSICAL EXAM: Primary | ICD-10-CM

## 2020-01-24 DIAGNOSIS — Z12.39 BREAST CANCER SCREENING: ICD-10-CM

## 2020-01-24 DIAGNOSIS — Z00.00 PHYSICAL EXAM: ICD-10-CM

## 2020-01-24 LAB
25(OH)D3 SERPL-MCNC: 33.4 NG/ML (ref 30–100)
ALBUMIN SERPL-MCNC: 3.5 G/DL (ref 3.4–5)
ALBUMIN/GLOB SERPL: 0.9 {RATIO} (ref 1–2)
ALP LIVER SERPL-CCNC: 70 U/L (ref 37–98)
ALT SERPL-CCNC: 53 U/L (ref 13–56)
ANION GAP SERPL CALC-SCNC: 5 MMOL/L (ref 0–18)
AST SERPL-CCNC: 23 U/L (ref 15–37)
BACTERIA UR QL AUTO: NEGATIVE /HPF
BACTERIA UR QL AUTO: NEGATIVE /HPF
BASOPHILS # BLD AUTO: 0.05 X10(3) UL (ref 0–0.2)
BASOPHILS NFR BLD AUTO: 0.5 %
BILIRUB SERPL-MCNC: 0.3 MG/DL (ref 0.1–2)
BILIRUB UR QL: NEGATIVE
BUN BLD-MCNC: 11 MG/DL (ref 7–18)
BUN/CREAT SERPL: 16.7 (ref 10–20)
CALCIUM BLD-MCNC: 8.5 MG/DL (ref 8.5–10.1)
CHLORIDE SERPL-SCNC: 107 MMOL/L (ref 98–112)
CHOLEST SMN-MCNC: 130 MG/DL (ref ?–200)
CLARITY UR: CLEAR
CO2 SERPL-SCNC: 28 MMOL/L (ref 21–32)
COLOR UR: YELLOW
CREAT BLD-MCNC: 0.66 MG/DL (ref 0.55–1.02)
DEPRECATED RDW RBC AUTO: 39.8 FL (ref 35.1–46.3)
EOSINOPHIL # BLD AUTO: 0.17 X10(3) UL (ref 0–0.7)
EOSINOPHIL NFR BLD AUTO: 1.6 %
ERYTHROCYTE [DISTWIDTH] IN BLOOD BY AUTOMATED COUNT: 14.2 % (ref 11–15)
EST. AVERAGE GLUCOSE BLD GHB EST-MCNC: 148 MG/DL (ref 68–126)
GLOBULIN PLAS-MCNC: 3.8 G/DL (ref 2.8–4.4)
GLUCOSE BLD-MCNC: 100 MG/DL (ref 70–99)
GLUCOSE UR-MCNC: NEGATIVE MG/DL
HBA1C MFR BLD HPLC: 6.8 % (ref ?–5.7)
HCT VFR BLD AUTO: 40.6 % (ref 35–48)
HDLC SERPL-MCNC: 25 MG/DL (ref 40–59)
HGB BLD-MCNC: 12.9 G/DL (ref 12–16)
HGB UR QL STRIP.AUTO: NEGATIVE
IMM GRANULOCYTES # BLD AUTO: 0.06 X10(3) UL (ref 0–1)
IMM GRANULOCYTES NFR BLD: 0.6 %
KETONES UR-MCNC: NEGATIVE MG/DL
LDLC SERPL CALC-MCNC: 72 MG/DL (ref ?–100)
LEUKOCYTE ESTERASE UR QL STRIP.AUTO: NEGATIVE
LYMPHOCYTES # BLD AUTO: 3.29 X10(3) UL (ref 1–4)
LYMPHOCYTES NFR BLD AUTO: 30.5 %
M PROTEIN MFR SERPL ELPH: 7.3 G/DL (ref 6.4–8.2)
MCH RBC QN AUTO: 24.8 PG (ref 26–34)
MCHC RBC AUTO-ENTMCNC: 31.8 G/DL (ref 31–37)
MCV RBC AUTO: 77.9 FL (ref 80–100)
MONOCYTES # BLD AUTO: 0.43 X10(3) UL (ref 0.1–1)
MONOCYTES NFR BLD AUTO: 4 %
NEUTROPHILS # BLD AUTO: 6.78 X10 (3) UL (ref 1.5–7.7)
NEUTROPHILS # BLD AUTO: 6.78 X10(3) UL (ref 1.5–7.7)
NEUTROPHILS NFR BLD AUTO: 62.8 %
NITRITE UR QL STRIP.AUTO: NEGATIVE
NONHDLC SERPL-MCNC: 105 MG/DL (ref ?–130)
OSMOLALITY SERPL CALC.SUM OF ELEC: 289 MOSM/KG (ref 275–295)
PATIENT FASTING Y/N/NP: YES
PATIENT FASTING Y/N/NP: YES
PH UR: 5 [PH] (ref 5–8)
PLATELET # BLD AUTO: 363 10(3)UL (ref 150–450)
POTASSIUM SERPL-SCNC: 4.1 MMOL/L (ref 3.5–5.1)
PROT UR-MCNC: NEGATIVE MG/DL
RBC # BLD AUTO: 5.21 X10(6)UL (ref 3.8–5.3)
RBC #/AREA URNS AUTO: 1 /HPF
RBC #/AREA URNS AUTO: 1 /HPF
SODIUM SERPL-SCNC: 140 MMOL/L (ref 136–145)
SP GR UR STRIP: 1.02 (ref 1–1.03)
TRIGL SERPL-MCNC: 165 MG/DL (ref 30–149)
TSI SER-ACNC: 2.54 MIU/ML (ref 0.36–3.74)
UROBILINOGEN UR STRIP-ACNC: <2
VLDLC SERPL CALC-MCNC: 33 MG/DL (ref 0–30)
WBC # BLD AUTO: 10.8 X10(3) UL (ref 4–11)
WBC #/AREA URNS AUTO: 2 /HPF
WBC #/AREA URNS AUTO: 2 /HPF

## 2020-01-24 PROCEDURE — 90686 IIV4 VACC NO PRSV 0.5 ML IM: CPT | Performed by: FAMILY MEDICINE

## 2020-01-24 PROCEDURE — 84443 ASSAY THYROID STIM HORMONE: CPT

## 2020-01-24 PROCEDURE — 90471 IMMUNIZATION ADMIN: CPT | Performed by: FAMILY MEDICINE

## 2020-01-24 PROCEDURE — 36415 COLL VENOUS BLD VENIPUNCTURE: CPT

## 2020-01-24 PROCEDURE — 80061 LIPID PANEL: CPT

## 2020-01-24 PROCEDURE — 81001 URINALYSIS AUTO W/SCOPE: CPT | Performed by: FAMILY MEDICINE

## 2020-01-24 PROCEDURE — 85025 COMPLETE CBC W/AUTO DIFF WBC: CPT

## 2020-01-24 PROCEDURE — 81015 MICROSCOPIC EXAM OF URINE: CPT | Performed by: FAMILY MEDICINE

## 2020-01-24 PROCEDURE — 99395 PREV VISIT EST AGE 18-39: CPT | Performed by: FAMILY MEDICINE

## 2020-01-24 PROCEDURE — 83036 HEMOGLOBIN GLYCOSYLATED A1C: CPT

## 2020-01-24 PROCEDURE — 80053 COMPREHEN METABOLIC PANEL: CPT

## 2020-01-24 PROCEDURE — 82306 VITAMIN D 25 HYDROXY: CPT | Performed by: FAMILY MEDICINE

## 2020-01-24 RX ORDER — BENZONATATE 200 MG/1
200 CAPSULE ORAL 3 TIMES DAILY PRN
Qty: 30 CAPSULE | Refills: 0 | Status: SHIPPED | OUTPATIENT
Start: 2020-01-24 | End: 2020-10-29

## 2020-01-24 NOTE — PROGRESS NOTES
1/24/2020  9:46 AM    Milena Garcia is a 44year old female.     Chief complaint(s): Patient presents with:  Routine Physical: Normal Pap Smear last done 1/23/19; Normal Mammo done 2019; Pt is fasting    HPI:     Milena Garcia primary complain daily as needed. 30 capsule 0   • Mometasone Furoate 0.1 % External Cream Apply to affected area(s) BID 30 g 1   • ibuprofen 600 MG Oral Tab Take 1 tablet (600 mg total) by mouth every 6 (six) hours as needed for Pain. Always take it with food.  60 tablet 0 throat clear without lesions or exudate and normal tonsils. Neck: Neck supple. No JVD present. No thyromegaly present. Cardiovascular: Normal rate, regular rhythm and S2 normal.  Exam reveals no gallop and no friction rub. No murmur heard.   Pulmonar include: safety (i.e. seat belts, helmets, sunscreen, protective sports gear ), nutrition (i.e. healthy meals and snacks (i.e. avoid junk food and high-carbohydrate foods); athletic conditioning, fluids; low fat milk, limit to less than 20 oz. a day; denta

## 2020-01-30 ENCOUNTER — OFFICE VISIT (OUTPATIENT)
Dept: FAMILY MEDICINE CLINIC | Facility: CLINIC | Age: 40
End: 2020-01-30

## 2020-01-30 VITALS
HEIGHT: 60 IN | DIASTOLIC BLOOD PRESSURE: 72 MMHG | BODY MASS INDEX: 36.91 KG/M2 | SYSTOLIC BLOOD PRESSURE: 113 MMHG | HEART RATE: 80 BPM | WEIGHT: 188 LBS | TEMPERATURE: 98 F

## 2020-01-30 DIAGNOSIS — Z79.4 TYPE 2 DIABETES MELLITUS WITHOUT COMPLICATION, WITH LONG-TERM CURRENT USE OF INSULIN (HCC): Primary | ICD-10-CM

## 2020-01-30 DIAGNOSIS — E11.9 TYPE 2 DIABETES MELLITUS WITHOUT COMPLICATION, WITH LONG-TERM CURRENT USE OF INSULIN (HCC): Primary | ICD-10-CM

## 2020-01-30 PROCEDURE — 99214 OFFICE O/P EST MOD 30 MIN: CPT | Performed by: FAMILY MEDICINE

## 2020-01-30 NOTE — PROGRESS NOTES
1/30/2020  1:21 PM    Jos Avery is a 44year old female. Chief complaint(s): Patient presents with:  Test Results: discuss recent abn A1c     HPI:     Jos Avery primary complaint is regarding new onset diabetes.      Patient Wilma Whittington Medications   Medication Sig Dispense Refill   • benzonatate 200 MG Oral Cap Take 1 capsule (200 mg total) by mouth 3 (three) times daily as needed.  30 capsule 0   • L-Methylfolate-Algae-B12-B6 (METANX) 3-90.314-2-35 MG Oral Cap Take 2 capsules by mouth da 11 7 - 18 mg/dL    Creatinine 0.66 0.55 - 1.02 mg/dL    BUN/CREA Ratio 16.7 10.0 - 20.0    Calcium, Total 8.5 8.5 - 10.1 mg/dL    Calculated Osmolality 289 275 - 295 mOsm/kg    GFR, Non- 112 >=60    GFR, -American 129 >=60    ALT 53 Absolute Prelim 6.78 1.50 - 7.70 x10 (3) uL    Neutrophil Absolute 6.78 1.50 - 7.70 x10(3) uL    Lymphocyte Absolute 3.29 1.00 - 4.00 x10(3) uL    Monocyte Absolute 0.43 0.10 - 1.00 x10(3) uL    Eosinophil Absolute 0.17 0.00 - 0.70 x10(3) uL    Basophil Ab self-inspection, need for yearly flu shots, and avoid all sodas, juices, candy, chocolates, cakes, ice cream, etc.      FOLLOW-UP: Schedule a follow-up visit in 4 months.        COUNSELING: The patient was counseled concerning the relationship between diabe

## 2020-02-07 ENCOUNTER — HOSPITAL ENCOUNTER (OUTPATIENT)
Dept: ENDOCRINOLOGY | Facility: HOSPITAL | Age: 40
Discharge: HOME | End: 2020-02-07
Attending: FAMILY MEDICINE
Payer: COMMERCIAL

## 2020-02-07 ENCOUNTER — TELEPHONE (OUTPATIENT)
Dept: ENDOCRINOLOGY | Facility: HOSPITAL | Age: 40
End: 2020-02-07

## 2020-02-07 VITALS — BODY MASS INDEX: 37 KG/M2 | WEIGHT: 188 LBS

## 2020-02-07 DIAGNOSIS — E11.9 TYPE 2 DIABETES MELLITUS (HCC): Primary | ICD-10-CM

## 2020-02-07 DIAGNOSIS — E11.65 TYPE 2 DIABETES MELLITUS WITH HYPERGLYCEMIA, WITHOUT LONG-TERM CURRENT USE OF INSULIN (HCC): Primary | ICD-10-CM

## 2020-02-07 PROCEDURE — 97802 MEDICAL NUTRITION INDIV IN: CPT

## 2020-02-07 NOTE — ADDENDUM NOTE
Encounter addended by: Kashmir Jarquin RD on: 2/7/2020 3:28 PM   Actions taken: Clinical Note Signed

## 2020-02-07 NOTE — TELEPHONE ENCOUNTER
Dr. Thiago Robins,    Could you please sign this pended order for medical nutrition therapy as this is updated per insurance. Thank you.

## 2020-02-07 NOTE — PROGRESS NOTES
Medical Nutrition Therapy Assessment    John Simms 6/12/1980 was seen for individual Diabetic Medical Nutrition Therapy/ initial/ individual:    Date: 2/7/2020   Start time 133  End time: 1430    Assessment     Used  for past of sebas management as related to diabetes   [x] discussed basic meal planning guidelines for diabetes regular mealtime, limited concentrated sweets.  Worked on establishing eating pattern/timing of meals and snacks    [x] discussed in depth meal planning using the trends    Evaluation  Behavioral Goal(s) selected:   Healthy Eating    Support Plan:  Other: deferred until next appt    Plan  Follow up set for  Call Braulio Flores RD at [phone] (option 3) for problems/concerns.        Braulio Flores RD

## 2020-02-24 ENCOUNTER — HOSPITAL ENCOUNTER (OUTPATIENT)
Dept: ENDOCRINOLOGY | Facility: HOSPITAL | Age: 40
Discharge: HOME | End: 2020-02-24
Attending: FAMILY MEDICINE
Payer: COMMERCIAL

## 2020-02-24 VITALS — WEIGHT: 186.81 LBS | BODY MASS INDEX: 36 KG/M2

## 2020-02-24 DIAGNOSIS — E11.65 TYPE 2 DIABETES MELLITUS WITH HYPERGLYCEMIA, WITHOUT LONG-TERM CURRENT USE OF INSULIN (HCC): Primary | ICD-10-CM

## 2020-02-24 PROCEDURE — 97803 MED NUTRITION INDIV SUBSEQ: CPT

## 2020-02-24 NOTE — PROGRESS NOTES
Medical Nutrition Therapy Assessment    Jos Avery 6/12/1980 was seen for individual Diabetic Medical Nutrition Therapy/ Follow Up/ Individual:      Date: 2/24/2020   Start time 1300  End time: 1330    Anthropometrics:  LMP 01/25/2020     Current lipid management, and BP management as related to diabetes   [x] discussed basic meal planning guidelines for diabetes regular mealtime, limited concentrated sweets.  Worked on establishing eating pattern/timing of meals and snacks    [x] discussed in depth

## 2020-06-08 ENCOUNTER — OFFICE VISIT (OUTPATIENT)
Dept: FAMILY MEDICINE CLINIC | Facility: CLINIC | Age: 40
End: 2020-06-08

## 2020-06-08 VITALS
HEART RATE: 65 BPM | WEIGHT: 184 LBS | TEMPERATURE: 98 F | HEIGHT: 60 IN | DIASTOLIC BLOOD PRESSURE: 77 MMHG | SYSTOLIC BLOOD PRESSURE: 121 MMHG | BODY MASS INDEX: 36.12 KG/M2

## 2020-06-08 DIAGNOSIS — Z79.4 TYPE 2 DIABETES MELLITUS WITHOUT COMPLICATION, WITH LONG-TERM CURRENT USE OF INSULIN (HCC): Primary | ICD-10-CM

## 2020-06-08 DIAGNOSIS — E11.9 TYPE 2 DIABETES MELLITUS WITHOUT COMPLICATION, WITH LONG-TERM CURRENT USE OF INSULIN (HCC): Primary | ICD-10-CM

## 2020-06-08 DIAGNOSIS — K04.7 TOOTH ABSCESS: ICD-10-CM

## 2020-06-08 PROCEDURE — 96372 THER/PROPH/DIAG INJ SC/IM: CPT | Performed by: FAMILY MEDICINE

## 2020-06-08 PROCEDURE — 99214 OFFICE O/P EST MOD 30 MIN: CPT | Performed by: FAMILY MEDICINE

## 2020-06-08 RX ORDER — CEFTRIAXONE 1 G/1
1000 INJECTION, POWDER, FOR SOLUTION INTRAMUSCULAR; INTRAVENOUS ONCE
Status: COMPLETED | OUTPATIENT
Start: 2020-06-08 | End: 2020-06-08

## 2020-06-08 RX ADMIN — CEFTRIAXONE 1000 MG: 1 INJECTION, POWDER, FOR SOLUTION INTRAMUSCULAR; INTRAVENOUS at 14:58:00

## 2020-06-08 NOTE — PROGRESS NOTES
6/8/2020  2:17 PM    Faustina Lucero is a 44year old female. Chief complaint(s): Patient presents with: Follow - Up: diabetes     HPI:     Faustina Lucero primary complaint is regarding diabetes.      Patient Faustina Lucero is a 44 year & older 0.5 ml Prefilled syringe (37983)                          01/24/2020      TDAP                  01/23/2019      Medications (Active prior to today's visit):  Current Outpatient Medications   Medication Sig Dispense Refill   • benzonatate 200 MG Ora Anion Gap 5 0 - 18 mmol/L    BUN 11 7 - 18 mg/dL    Creatinine 0.66 0.55 - 1.02 mg/dL    BUN/CREA Ratio 16.7 10.0 - 20.0    Calcium, Total 8.5 8.5 - 10.1 mg/dL    Calculated Osmolality 289 275 - 295 mOsm/kg    GFR, Non- 112 >=60    GFR, Afr - 450.0 10(3)uL    Neutrophil Absolute Prelim 6.78 1.50 - 7.70 x10 (3) uL    Neutrophil Absolute 6.78 1.50 - 7.70 x10(3) uL    Lymphocyte Absolute 3.29 1.00 - 4.00 x10(3) uL    Monocyte Absolute 0.43 0.10 - 1.00 x10(3) uL    Eosinophil Absolute 0.17 0.00 - weight loss, a graduated exercise program, HgbA1C level checked quarterly, daily foot self-inspection, need for yearly flu shots, and avoid all sodas, juices, candy, chocolates, cakes, ice cream, etc.      FOLLOW-UP: Schedule a follow-up visit in 5 months.

## 2020-07-28 ENCOUNTER — APPOINTMENT (OUTPATIENT)
Dept: LAB | Age: 40
End: 2020-07-28
Attending: FAMILY MEDICINE
Payer: COMMERCIAL

## 2020-07-28 DIAGNOSIS — Z79.4 TYPE 2 DIABETES MELLITUS WITHOUT COMPLICATION, WITH LONG-TERM CURRENT USE OF INSULIN (HCC): ICD-10-CM

## 2020-07-28 DIAGNOSIS — E11.9 TYPE 2 DIABETES MELLITUS WITHOUT COMPLICATION, WITH LONG-TERM CURRENT USE OF INSULIN (HCC): ICD-10-CM

## 2020-07-28 LAB
CREAT UR-SCNC: 104 MG/DL
MICROALBUMIN UR-MCNC: 0.68 MG/DL
MICROALBUMIN/CREAT 24H UR-RTO: 6.5 UG/MG (ref ?–30)

## 2020-07-28 PROCEDURE — 82570 ASSAY OF URINE CREATININE: CPT

## 2020-07-28 PROCEDURE — 82043 UR ALBUMIN QUANTITATIVE: CPT

## 2020-09-09 ENCOUNTER — HOSPITAL ENCOUNTER (OUTPATIENT)
Dept: MAMMOGRAPHY | Facility: HOSPITAL | Age: 40
Discharge: HOME | End: 2020-09-09
Attending: FAMILY MEDICINE
Payer: COMMERCIAL

## 2020-09-09 DIAGNOSIS — Z12.39 BREAST CANCER SCREENING: ICD-10-CM

## 2020-09-09 PROCEDURE — 77067 SCR MAMMO BI INCL CAD: CPT | Performed by: FAMILY MEDICINE

## 2020-09-09 PROCEDURE — 77063 BREAST TOMOSYNTHESIS BI: CPT | Performed by: FAMILY MEDICINE

## 2020-10-29 ENCOUNTER — OFFICE VISIT (OUTPATIENT)
Dept: FAMILY MEDICINE CLINIC | Facility: CLINIC | Age: 40
End: 2020-10-29

## 2020-10-29 VITALS
TEMPERATURE: 98 F | HEART RATE: 66 BPM | HEIGHT: 60 IN | BODY MASS INDEX: 35.86 KG/M2 | DIASTOLIC BLOOD PRESSURE: 64 MMHG | WEIGHT: 182.63 LBS | SYSTOLIC BLOOD PRESSURE: 109 MMHG

## 2020-10-29 DIAGNOSIS — Z13.9 VISIT FOR SCREENING: ICD-10-CM

## 2020-10-29 DIAGNOSIS — K21.9 GASTROESOPHAGEAL REFLUX DISEASE WITHOUT ESOPHAGITIS: Primary | ICD-10-CM

## 2020-10-29 PROCEDURE — 3074F SYST BP LT 130 MM HG: CPT | Performed by: FAMILY MEDICINE

## 2020-10-29 PROCEDURE — 90471 IMMUNIZATION ADMIN: CPT | Performed by: FAMILY MEDICINE

## 2020-10-29 PROCEDURE — 3008F BODY MASS INDEX DOCD: CPT | Performed by: FAMILY MEDICINE

## 2020-10-29 PROCEDURE — 99213 OFFICE O/P EST LOW 20 MIN: CPT | Performed by: FAMILY MEDICINE

## 2020-10-29 PROCEDURE — 3078F DIAST BP <80 MM HG: CPT | Performed by: FAMILY MEDICINE

## 2020-10-29 PROCEDURE — 90686 IIV4 VACC NO PRSV 0.5 ML IM: CPT | Performed by: FAMILY MEDICINE

## 2020-10-29 RX ORDER — L-METHYLFOLATE-ALGAE-VIT B12-B6 CAP 3-90.314-2-35 MG 3-90.314-2-35 MG
CAP ORAL
COMMUNITY
End: 2021-01-30

## 2020-10-29 RX ORDER — FAMOTIDINE 20 MG/1
20 TABLET ORAL 2 TIMES DAILY
Qty: 40 TABLET | Refills: 0 | Status: SHIPPED | OUTPATIENT
Start: 2020-10-29 | End: 2021-01-19 | Stop reason: ALTCHOICE

## 2020-10-29 NOTE — PROGRESS NOTES
10/29/2020  2:28 PM    Jorge Hubbard is a 36year old female.     Chief complaint(s): Patient presents with:  Referral: eye doctor   Immunization/Injection: flu vaccine   Bloating    HPI:     Jorge Hubbard primary complaint is regarding as abo times daily. 40 tablet 0   • ibuprofen 600 MG Oral Tab Take 1 tablet (600 mg total) by mouth every 6 (six) hours as needed for Pain. Always take it with food.  60 tablet 0       Allergies:  No Known Allergies      ROS:   Review of Systems   Constitutional: ([id number])    RECOMMENDATIONS given include: PUD Diet Instructions: Avoid any citric juice; lemonade, orange juice, grapefruit juice, pineapple juice, caffeine; coffee, tea, soda, chocolate. Also avoid spicy foods, and alcohol.  In lyubov avoid over use of NS

## 2020-11-02 ENCOUNTER — TELEPHONE (OUTPATIENT)
Dept: SURGERY | Facility: HOSPITAL | Age: 40
End: 2020-11-02

## 2020-12-21 ENCOUNTER — TELEPHONE (OUTPATIENT)
Dept: FAMILY MEDICINE CLINIC | Facility: CLINIC | Age: 40
End: 2020-12-21

## 2020-12-21 NOTE — TELEPHONE ENCOUNTER
Dr. Yajaira Hannah is requesting retroauthorization for referral 10/29/20. Referral was entered and it states authorized, but he states he needs authorization in order to be reimbursed.

## 2021-01-19 ENCOUNTER — OFFICE VISIT (OUTPATIENT)
Dept: FAMILY MEDICINE CLINIC | Facility: CLINIC | Age: 41
End: 2021-01-19

## 2021-01-19 ENCOUNTER — NURSE TRIAGE (OUTPATIENT)
Dept: FAMILY MEDICINE CLINIC | Facility: CLINIC | Age: 41
End: 2021-01-19

## 2021-01-19 VITALS
DIASTOLIC BLOOD PRESSURE: 74 MMHG | WEIGHT: 183.19 LBS | SYSTOLIC BLOOD PRESSURE: 108 MMHG | HEART RATE: 84 BPM | BODY MASS INDEX: 36 KG/M2

## 2021-01-19 DIAGNOSIS — G43.009 MIGRAINE WITHOUT AURA AND WITHOUT STATUS MIGRAINOSUS, NOT INTRACTABLE: Primary | ICD-10-CM

## 2021-01-19 PROCEDURE — 3074F SYST BP LT 130 MM HG: CPT | Performed by: FAMILY MEDICINE

## 2021-01-19 PROCEDURE — 96372 THER/PROPH/DIAG INJ SC/IM: CPT | Performed by: FAMILY MEDICINE

## 2021-01-19 PROCEDURE — 3078F DIAST BP <80 MM HG: CPT | Performed by: FAMILY MEDICINE

## 2021-01-19 PROCEDURE — 99213 OFFICE O/P EST LOW 20 MIN: CPT | Performed by: FAMILY MEDICINE

## 2021-01-19 RX ORDER — KETOROLAC TROMETHAMINE 30 MG/ML
60 INJECTION, SOLUTION INTRAMUSCULAR; INTRAVENOUS ONCE
Status: DISCONTINUED | OUTPATIENT
Start: 2021-01-19 | End: 2021-01-19

## 2021-01-19 RX ORDER — KETOROLAC TROMETHAMINE 30 MG/ML
60 INJECTION, SOLUTION INTRAMUSCULAR; INTRAVENOUS ONCE
Status: COMPLETED | OUTPATIENT
Start: 2021-01-19 | End: 2021-01-19

## 2021-01-19 RX ORDER — SUMATRIPTAN 100 MG/1
100 TABLET, FILM COATED ORAL EVERY 2 HOUR PRN
Qty: 9 TABLET | Refills: 1 | Status: SHIPPED | OUTPATIENT
Start: 2021-01-19

## 2021-01-19 RX ADMIN — KETOROLAC TROMETHAMINE 60 MG: 30 INJECTION, SOLUTION INTRAMUSCULAR; INTRAVENOUS at 12:56:00

## 2021-01-19 NOTE — PROGRESS NOTES
HPI:    Patient ID: Orestes Cannon is a 36year old female. Headache   This is a new problem. The current episode started today. The problem has been gradually worsening. The pain is located in the frontal and bilateral region.  The quality of the no cervical adenopathy. Neurological: She is alert and oriented to person, place, and time. No cranial nerve deficit. Coordination normal.   Extraocular movements intact. No arm drift. Finger-to-nose normal.  Gait normal   Skin: Skin is warm and dry.

## 2021-01-19 NOTE — TELEPHONE ENCOUNTER
Action Requested: Summary for Provider     []  Critical Lab, Recommendations Needed  [] Need Additional Advice  []   FYI    []   Need Orders  [] Need Medications Sent to Pharmacy  []  Other     SUMMARY: Per protocol: Go to ER.  Patient states that her pain

## 2021-01-19 NOTE — PATIENT INSTRUCTIONS
Dolor de wayne por migraña: [place] y tratamiento    Las migrañas tienden a evolucionar por [place]ap[place]. Si reconoce cuáles son esas fases, podrá comprender mejor lo que está sucediendo.  Entonces, podrá aprender [employer] de aliviar el dolor y calmar otros sínt · Descanse. Si es posible, recuéstese. Trate de no inclinarse hacia adelante, ya que saleem movimiento podría empeorar el dolor. A veces, acostarse en lili habitación tranquila y [place] puede evitar que la migraña se [employer] · [employer] cafeína.  Danni Cooks

## 2021-01-21 ENCOUNTER — OFFICE VISIT (OUTPATIENT)
Dept: FAMILY MEDICINE CLINIC | Facility: CLINIC | Age: 41
End: 2021-01-21

## 2021-01-21 ENCOUNTER — LAB ENCOUNTER (OUTPATIENT)
Dept: LAB | Age: 41
End: 2021-01-21
Attending: FAMILY MEDICINE
Payer: COMMERCIAL

## 2021-01-21 VITALS
HEART RATE: 64 BPM | WEIGHT: 181.38 LBS | SYSTOLIC BLOOD PRESSURE: 116 MMHG | BODY MASS INDEX: 35.61 KG/M2 | HEIGHT: 60 IN | DIASTOLIC BLOOD PRESSURE: 76 MMHG

## 2021-01-21 DIAGNOSIS — Z79.4 TYPE 2 DIABETES MELLITUS WITHOUT COMPLICATION, WITH LONG-TERM CURRENT USE OF INSULIN (HCC): Primary | ICD-10-CM

## 2021-01-21 DIAGNOSIS — E11.9 TYPE 2 DIABETES MELLITUS WITHOUT COMPLICATION, WITH LONG-TERM CURRENT USE OF INSULIN (HCC): Primary | ICD-10-CM

## 2021-01-21 DIAGNOSIS — E11.9 TYPE 2 DIABETES MELLITUS WITHOUT COMPLICATION, WITH LONG-TERM CURRENT USE OF INSULIN (HCC): ICD-10-CM

## 2021-01-21 DIAGNOSIS — Z79.4 TYPE 2 DIABETES MELLITUS WITHOUT COMPLICATION, WITH LONG-TERM CURRENT USE OF INSULIN (HCC): ICD-10-CM

## 2021-01-21 DIAGNOSIS — R51.9 HEADACHE DISORDER: ICD-10-CM

## 2021-01-21 LAB
ALBUMIN SERPL-MCNC: 3.5 G/DL (ref 3.4–5)
ALBUMIN/GLOB SERPL: 0.9 {RATIO} (ref 1–2)
ALP LIVER SERPL-CCNC: 80 U/L
ALT SERPL-CCNC: 34 U/L
ANION GAP SERPL CALC-SCNC: 4 MMOL/L (ref 0–18)
AST SERPL-CCNC: 22 U/L (ref 15–37)
BILIRUB SERPL-MCNC: 0.3 MG/DL (ref 0.1–2)
BUN BLD-MCNC: 8 MG/DL (ref 7–18)
BUN/CREAT SERPL: 11.8 (ref 10–20)
CALCIUM BLD-MCNC: 9.4 MG/DL (ref 8.5–10.1)
CARTRIDGE LOT#: ABNORMAL NUMERIC
CHLORIDE SERPL-SCNC: 107 MMOL/L (ref 98–112)
CHOLEST SMN-MCNC: 140 MG/DL (ref ?–200)
CO2 SERPL-SCNC: 29 MMOL/L (ref 21–32)
CREAT BLD-MCNC: 0.68 MG/DL
CREAT UR-SCNC: 209 MG/DL
GLOBULIN PLAS-MCNC: 4 G/DL (ref 2.8–4.4)
GLUCOSE BLD-MCNC: 78 MG/DL (ref 70–99)
HDLC SERPL-MCNC: 38 MG/DL (ref 40–59)
HEMOGLOBIN A1C: 5.8 % (ref 4.3–5.6)
LDLC SERPL CALC-MCNC: 74 MG/DL (ref ?–100)
M PROTEIN MFR SERPL ELPH: 7.5 G/DL (ref 6.4–8.2)
MICROALBUMIN UR-MCNC: 0.96 MG/DL
MICROALBUMIN/CREAT 24H UR-RTO: 4.6 UG/MG (ref ?–30)
NONHDLC SERPL-MCNC: 102 MG/DL (ref ?–130)
OSMOLALITY SERPL CALC.SUM OF ELEC: 287 MOSM/KG (ref 275–295)
PATIENT FASTING Y/N/NP: YES
PATIENT FASTING Y/N/NP: YES
POTASSIUM SERPL-SCNC: 4.2 MMOL/L (ref 3.5–5.1)
SODIUM SERPL-SCNC: 140 MMOL/L (ref 136–145)
TRIGL SERPL-MCNC: 140 MG/DL (ref 30–149)
VLDLC SERPL CALC-MCNC: 28 MG/DL (ref 0–30)

## 2021-01-21 PROCEDURE — 36415 COLL VENOUS BLD VENIPUNCTURE: CPT

## 2021-01-21 PROCEDURE — 80053 COMPREHEN METABOLIC PANEL: CPT

## 2021-01-21 PROCEDURE — 3074F SYST BP LT 130 MM HG: CPT | Performed by: FAMILY MEDICINE

## 2021-01-21 PROCEDURE — 3078F DIAST BP <80 MM HG: CPT | Performed by: FAMILY MEDICINE

## 2021-01-21 PROCEDURE — 3008F BODY MASS INDEX DOCD: CPT | Performed by: FAMILY MEDICINE

## 2021-01-21 PROCEDURE — 80061 LIPID PANEL: CPT

## 2021-01-21 PROCEDURE — 3061F NEG MICROALBUMINURIA REV: CPT | Performed by: FAMILY MEDICINE

## 2021-01-21 PROCEDURE — 82570 ASSAY OF URINE CREATININE: CPT

## 2021-01-21 PROCEDURE — 83036 HEMOGLOBIN GLYCOSYLATED A1C: CPT | Performed by: FAMILY MEDICINE

## 2021-01-21 PROCEDURE — 3044F HG A1C LEVEL LT 7.0%: CPT | Performed by: FAMILY MEDICINE

## 2021-01-21 PROCEDURE — 82043 UR ALBUMIN QUANTITATIVE: CPT

## 2021-01-21 PROCEDURE — 36416 COLLJ CAPILLARY BLOOD SPEC: CPT | Performed by: FAMILY MEDICINE

## 2021-01-21 PROCEDURE — 99214 OFFICE O/P EST MOD 30 MIN: CPT | Performed by: FAMILY MEDICINE

## 2021-01-21 RX ORDER — KETOROLAC TROMETHAMINE 10 MG/1
10 TABLET, FILM COATED ORAL EVERY 6 HOURS PRN
Qty: 20 TABLET | Refills: 0 | Status: SHIPPED | OUTPATIENT
Start: 2021-01-21

## 2021-01-21 NOTE — PROGRESS NOTES
1/21/2021  9:33 AM    Beryle Hunter is a 36year old female. Chief complaint(s): Patient presents with:  Diabetes: f/u   Headache: requesting MRI order, Sumatriptan helps but she feels weak cannot drive when she takes it.      HPI:     Abdiel Cuenca present time she has no headaches. Description of Headache: The location is primarily on frontal temporal.  It does not radiate. She characterizes the headache as mild in severity; characterized as  throbbing, pounding.   Associated symptoms include no (METANX) 8-35.583-3-86 MG Oral Cap Take by mouth. Allergies:  No Known Allergies      ROS:   Review of Systems   Constitutional: Negative for chills, fatigue and fever. Respiratory: Negative for shortness of breath.     Cardiovascular: Negative fo with long-term current use of insulin ([facility] [address]  DIABETES A&P    LABORATORY & ORDERS: Blood test(s) ordered today ; Orders Placed This Encounter      POC Glycohemoglobin [10316]      ** Lipid Panel [E]      ** Microalb/Creat Ratio, Random Urine      **CMP  Com near future. Notify Dr Soledad Lackey or the [facility] if there is a deterioration or worsening of the medical condition. Also, inform the doctor with any new symptoms or medications' side effects.       FOLLOW-UP: Schedule a follow-up visit in 3 weeks / pr

## 2021-01-29 NOTE — TELEPHONE ENCOUNTER
Per pharmacy, pt requesting a refill for the following medication:    •  L-Methylfolate-Algae-B12-B6 (METANX) 3-90.314-2-35 MG Oral Cap, Take by mouth., Disp: , Rfl:

## 2021-01-30 RX ORDER — L-METHYLFOLATE-ALGAE-VIT B12-B6 CAP 3-90.314-2-35 MG 3-90.314-2-35 MG
1 CAP ORAL 2 TIMES DAILY
Qty: 60 CAPSULE | Refills: 5 | Status: SHIPPED | OUTPATIENT
Start: 2021-01-30 | End: 2021-06-23

## 2021-02-11 ENCOUNTER — OFFICE VISIT (OUTPATIENT)
Dept: FAMILY MEDICINE CLINIC | Facility: CLINIC | Age: 41
End: 2021-02-11

## 2021-02-11 VITALS
HEIGHT: 60 IN | SYSTOLIC BLOOD PRESSURE: 108 MMHG | HEART RATE: 58 BPM | BODY MASS INDEX: 35.5 KG/M2 | WEIGHT: 180.81 LBS | DIASTOLIC BLOOD PRESSURE: 72 MMHG

## 2021-02-11 DIAGNOSIS — G43.009 MIGRAINE WITHOUT AURA AND WITHOUT STATUS MIGRAINOSUS, NOT INTRACTABLE: Primary | ICD-10-CM

## 2021-02-11 PROCEDURE — 3074F SYST BP LT 130 MM HG: CPT | Performed by: FAMILY MEDICINE

## 2021-02-11 PROCEDURE — 3078F DIAST BP <80 MM HG: CPT | Performed by: FAMILY MEDICINE

## 2021-02-11 PROCEDURE — 99213 OFFICE O/P EST LOW 20 MIN: CPT | Performed by: FAMILY MEDICINE

## 2021-02-11 PROCEDURE — 3008F BODY MASS INDEX DOCD: CPT | Performed by: FAMILY MEDICINE

## 2021-02-11 NOTE — PROGRESS NOTES
2/11/2021  9:42 AM    Nicolas Perez is a 36year old female. Chief complaint(s): Patient presents with:  Headache: f/u    HPI:     Nicolas Perez primary complaint is regarding Headache.      Concerning headache,  Nicolas Perez was [employer] No    Drug use: No       Immunizations:     Immunization History  Administered            Date(s) Administered    FLULAVAL 6 months & older 0.5 ml Prefilled syringe (33354)                          01/24/2020  10/29/2020      TDAP                  01/23/20 01/21/21   LIPID PANEL   Result Value Ref Range    Cholesterol, Total 140 <200 mg/dL    HDL Cholesterol 38 (L) 40 - 59 mg/dL    Triglycerides 140 30 - 149 mg/dL    LDL Cholesterol 74 <100 mg/dL    VLDL 28 0 - 30 mg/dL    Non HDL Chol 102 <130 mg/dL    FAST SUMAtriptan Succinate (IMITREX) 100 MG Oral Tab, Take 1 tablet (100 mg total) by mouth every 2 (two) hours as needed for Migraine.  Use at onset; repeat once after 2 HRS-ONLY 2 IN 24 HR MAX (Patient not taking: Reported on 2/11/2021 ), Disp: 9 tablet, Rfl:

## 2021-05-15 ENCOUNTER — NURSE TRIAGE (OUTPATIENT)
Dept: FAMILY MEDICINE CLINIC | Facility: CLINIC | Age: 41
End: 2021-05-15

## 2021-05-15 NOTE — TELEPHONE ENCOUNTER
Action Requested: Summary for Provider     []  Critical Lab, Recommendations Needed  [] Need Additional Advice  []   FYI    []   Need Orders  [] Need Medications Sent to Pharmacy  []  Other     SUMMARY:    Per  #[id number], patient states sh

## 2021-05-17 ENCOUNTER — TELEMEDICINE (OUTPATIENT)
Dept: FAMILY MEDICINE CLINIC | Facility: CLINIC | Age: 41
End: 2021-05-17

## 2021-05-17 DIAGNOSIS — R09.81 NASAL CONGESTION: Primary | ICD-10-CM

## 2021-05-17 DIAGNOSIS — J06.9 VIRAL URI: ICD-10-CM

## 2021-05-17 PROCEDURE — 99213 OFFICE O/P EST LOW 20 MIN: CPT | Performed by: FAMILY MEDICINE

## 2021-05-17 RX ORDER — FLUTICASONE PROPIONATE 50 MCG
1 SPRAY, SUSPENSION (ML) NASAL DAILY
Qty: 1 BOTTLE | Refills: 0 | Status: SHIPPED | OUTPATIENT
Start: 2021-05-17 | End: 2021-08-09 | Stop reason: ALTCHOICE

## 2021-05-17 NOTE — PROGRESS NOTES
Virtual Telephone Check-In    Ellie Quiet verbally consents to a Virtual/Telephone Check-In service on 05/17/21.     Patient understands and accepts financial responsibility for any deductible, co-insurance and/or co-pays associated with this servi

## 2021-06-23 RX ORDER — L-METHYLFOLATE-ALGAE-VIT B12-B6 CAP 3-90.314-2-35 MG 3-90.314-2-35 MG
CAP ORAL
Qty: 180 CAPSULE | Refills: 0 | Status: SHIPPED | OUTPATIENT
Start: 2021-06-23 | End: 2021-09-18

## 2021-07-02 ENCOUNTER — TELEPHONE (OUTPATIENT)
Dept: CASE MANAGEMENT | Age: 41
End: 2021-07-02

## 2021-07-28 ENCOUNTER — TELEPHONE (OUTPATIENT)
Dept: FAMILY MEDICINE CLINIC | Facility: CLINIC | Age: 41
End: 2021-07-28

## 2021-07-28 NOTE — TELEPHONE ENCOUNTER
Patient is due for DM eye exam.   Referral authorized for Dr Juanis Delatorre. Language Line  Odilon Ruiz [phone] left msg to call back. *Patient is on Dr Bhavin Wick diabetes list although she sees Dr Darrick Carter.

## 2021-07-29 ENCOUNTER — OFFICE VISIT (OUTPATIENT)
Dept: FAMILY MEDICINE CLINIC | Facility: CLINIC | Age: 41
End: 2021-07-29

## 2021-07-29 VITALS
TEMPERATURE: 98 F | DIASTOLIC BLOOD PRESSURE: 72 MMHG | RESPIRATION RATE: 18 BRPM | HEART RATE: 90 BPM | HEIGHT: 60 IN | SYSTOLIC BLOOD PRESSURE: 108 MMHG | WEIGHT: 185.19 LBS | BODY MASS INDEX: 36.36 KG/M2

## 2021-07-29 DIAGNOSIS — L03.316 CELLULITIS OF UMBILICUS: Primary | ICD-10-CM

## 2021-07-29 PROCEDURE — 3074F SYST BP LT 130 MM HG: CPT | Performed by: FAMILY MEDICINE

## 2021-07-29 PROCEDURE — 3078F DIAST BP <80 MM HG: CPT | Performed by: FAMILY MEDICINE

## 2021-07-29 PROCEDURE — 3008F BODY MASS INDEX DOCD: CPT | Performed by: FAMILY MEDICINE

## 2021-07-29 PROCEDURE — 99213 OFFICE O/P EST LOW 20 MIN: CPT | Performed by: FAMILY MEDICINE

## 2021-07-29 RX ORDER — DOXYCYCLINE 100 MG/1
100 TABLET ORAL 2 TIMES DAILY
Qty: 20 TABLET | Refills: 0 | Status: SHIPPED | OUTPATIENT
Start: 2021-07-29 | End: 2021-08-08

## 2021-07-29 NOTE — PROGRESS NOTES
7/29/2021  3:11 PM    Ryne Lopez is a 39year old female. Chief complaint(s): Patient presents with:  Derm Problem: c/o irritation around belly botton x1week. c/o alot of itchiness.      HPI:     Ryne Lopez primary complaint is regard (IMITREX) 100 MG Oral Tab Take 1 tablet (100 mg total) by mouth every 2 (two) hours as needed for Migraine.  Use at onset; repeat once after 2 HRS-ONLY 2 IN 24 HR MAX (Patient not taking: Reported on 2/11/2021 ) 9 tablet 1       Allergies:  No Known Allergi Orders Placed This Encounter      Aerobic Bacterial Culture       RECOMMENDATIONS given include: Patient was reassured of  her medical condition and all questions and concerns were answered.  Patient was informed to please, call our office with any new or f

## 2021-08-09 ENCOUNTER — OFFICE VISIT (OUTPATIENT)
Dept: FAMILY MEDICINE CLINIC | Facility: CLINIC | Age: 41
End: 2021-08-09

## 2021-08-09 VITALS
HEIGHT: 60 IN | TEMPERATURE: 100 F | WEIGHT: 185 LBS | SYSTOLIC BLOOD PRESSURE: 106 MMHG | DIASTOLIC BLOOD PRESSURE: 68 MMHG | HEART RATE: 71 BPM | BODY MASS INDEX: 36.32 KG/M2

## 2021-08-09 DIAGNOSIS — H60.331 ACUTE SWIMMER'S EAR OF RIGHT SIDE: ICD-10-CM

## 2021-08-09 DIAGNOSIS — L03.316 CELLULITIS OF UMBILICUS: Primary | ICD-10-CM

## 2021-08-09 PROCEDURE — 3074F SYST BP LT 130 MM HG: CPT | Performed by: FAMILY MEDICINE

## 2021-08-09 PROCEDURE — 99213 OFFICE O/P EST LOW 20 MIN: CPT | Performed by: FAMILY MEDICINE

## 2021-08-09 PROCEDURE — 3008F BODY MASS INDEX DOCD: CPT | Performed by: FAMILY MEDICINE

## 2021-08-09 PROCEDURE — 3078F DIAST BP <80 MM HG: CPT | Performed by: FAMILY MEDICINE

## 2021-08-09 NOTE — PROGRESS NOTES
8/9/2021  2:18 PM    Faustina Lucero is a 39year old female. Chief complaint(s): Patient presents with:   Follow - Up: Umbilicus discharge  Ear Problem: Right ear - swimmer's ear     HPI:     Faustina Lucero primary complaint is regarding as a repeat once after 2 HRS-ONLY 2 IN 24 HR MAX (Patient not taking: Reported on 2/11/2021 ) 9 tablet 1       Allergies:  No Known Allergies      ROS:   Review of Systems   Constitutional: Negative for appetite change and fever.    HENT: Positive for ear pain ( Aerobic Smear 1+ Gram Positive Rods        EKG / Spirometry : -     Radiology: No results found.      ASSESSMENT/PLAN:   Assessment   Cellulitis of umbilicus  (primary encounter diagnosis)  Acute swimmer's ear of right side      MEDICATIONS:  •  Neomycin-Po

## 2021-09-17 ENCOUNTER — TELEPHONE (OUTPATIENT)
Dept: CASE MANAGEMENT | Age: 41
End: 2021-09-17

## 2021-09-17 DIAGNOSIS — E11.9 TYPE 2 DIABETES MELLITUS WITHOUT COMPLICATION, WITHOUT LONG-TERM CURRENT USE OF INSULIN (HCC): Primary | ICD-10-CM

## 2021-09-18 RX ORDER — L-METHYLFOLATE-ALGAE-VIT B12-B6 CAP 3-90.314-2-35 MG 3-90.314-2-35 MG
CAP ORAL
Qty: 180 CAPSULE | Refills: 0 | Status: SHIPPED | OUTPATIENT
Start: 2021-09-18

## 2021-10-06 ENCOUNTER — TELEPHONE (OUTPATIENT)
Dept: CASE MANAGEMENT | Age: 41
End: 2021-10-06

## 2021-10-06 NOTE — TELEPHONE ENCOUNTER
Patient is due for DM eye exam.   Referral approved for Dr Mirta Singer. Language Line  Lizzy Jonas [phone] left msg to call back.

## 2021-11-04 ENCOUNTER — TELEPHONE (OUTPATIENT)
Dept: FAMILY MEDICINE CLINIC | Facility: CLINIC | Age: 41
End: 2021-11-04

## 2021-11-04 ENCOUNTER — HOSPITAL ENCOUNTER (OUTPATIENT)
Age: 41
Discharge: HOME | End: 2021-11-04
Payer: COMMERCIAL

## 2021-11-04 VITALS
BODY MASS INDEX: 33.38 KG/M2 | RESPIRATION RATE: 18 BRPM | OXYGEN SATURATION: 99 % | HEART RATE: 69 BPM | HEIGHT: 60 IN | TEMPERATURE: 98 F | SYSTOLIC BLOOD PRESSURE: 116 MMHG | DIASTOLIC BLOOD PRESSURE: 72 MMHG | WEIGHT: 170 LBS

## 2021-11-04 DIAGNOSIS — T14.8XXA FOREIGN BODY IN SKIN: Primary | ICD-10-CM

## 2021-11-04 PROCEDURE — 99213 OFFICE O/P EST LOW 20 MIN: CPT | Performed by: NURSE PRACTITIONER

## 2021-11-04 PROCEDURE — 10120 INC&RMVL FB SUBQ TISS SMPL: CPT | Performed by: NURSE PRACTITIONER

## 2021-11-04 RX ORDER — CEPHALEXIN 500 MG/1
500 CAPSULE ORAL 4 TIMES DAILY
Qty: 40 CAPSULE | Refills: 0 | Status: SHIPPED | OUTPATIENT
Start: 2021-11-04 | End: 2021-11-14

## 2021-11-04 NOTE — ED PROVIDER NOTES
Patient Seen in: [facility]    History   Patient presents with:  FB in Skin    Stated Complaint: splinter under thumb nail rt hand    HPI    HPI: Diego Chau is a 39year old female who presents after an injury to the right thumb  th ED Triage Vitals [11/04/21 1437]   /72   Pulse 69   Resp 18   Temp 97.9 °F (36.6 °C)   Temp src Temporal   SpO2 99 %   O2 Device None (Room air)       Current:/72   Pulse 69   Temp 97.9 °F (36.6 °C) (Temporal)   Resp 18   Ht 152.4 cm (5') Cap  Take 1 capsule (500 mg total) by mouth 4 (four) times daily for 10 days. , Normal, Disp-40 capsule, R-0

## 2021-11-16 ENCOUNTER — OFFICE VISIT (OUTPATIENT)
Dept: FAMILY MEDICINE CLINIC | Facility: CLINIC | Age: 41
End: 2021-11-16
Payer: COMMERCIAL

## 2021-11-16 VITALS
HEART RATE: 80 BPM | WEIGHT: 180 LBS | DIASTOLIC BLOOD PRESSURE: 78 MMHG | SYSTOLIC BLOOD PRESSURE: 114 MMHG | BODY MASS INDEX: 35.34 KG/M2 | HEIGHT: 60 IN

## 2021-11-16 DIAGNOSIS — S60.459D FOREIGN BODY IN SKIN OF FINGER, SUBSEQUENT ENCOUNTER: Primary | ICD-10-CM

## 2021-11-16 PROCEDURE — 99213 OFFICE O/P EST LOW 20 MIN: CPT | Performed by: FAMILY MEDICINE

## 2021-11-16 PROCEDURE — 3008F BODY MASS INDEX DOCD: CPT | Performed by: FAMILY MEDICINE

## 2021-11-16 PROCEDURE — 3074F SYST BP LT 130 MM HG: CPT | Performed by: FAMILY MEDICINE

## 2021-11-16 PROCEDURE — 3078F DIAST BP <80 MM HG: CPT | Performed by: FAMILY MEDICINE

## 2021-11-16 RX ORDER — CEPHALEXIN 500 MG/1
500 CAPSULE ORAL 4 TIMES DAILY
COMMUNITY

## 2021-11-16 NOTE — PROGRESS NOTES
11/16/2021  10:28 AM    Jamila Samuels is a 39year old female. Chief complaint(s): Patient presents with:  Urgent Care F/u: foreign body in skin currenlty on Cephalexin    HPI:     Jamila Samuels primary complaint is regarding foreign body. 8/9/2021 ) 20 tablet 0   • SUMAtriptan Succinate (IMITREX) 100 MG Oral Tab Take 1 tablet (100 mg total) by mouth every 2 (two) hours as needed for Migraine.  Use at onset; repeat once after 2 HRS-ONLY 2 IN 24 HR MAX (Patient not taking: Reported on 2/11/202 worsening of the medical condition. Also, inform the doctor with any new symptoms or medications' side effects. FOLLOW-UP: Schedule a follow-up visit in  prn. Orders This Visit:  No orders of the defined types were placed in this encounter.

## 2021-12-13 ENCOUNTER — IMMUNIZATION (OUTPATIENT)
Dept: FAMILY MEDICINE CLINIC | Facility: CLINIC | Age: 41
End: 2021-12-13
Payer: COMMERCIAL

## 2021-12-13 DIAGNOSIS — Z23 NEED FOR VACCINATION: Primary | ICD-10-CM

## 2021-12-13 PROCEDURE — 90471 IMMUNIZATION ADMIN: CPT | Performed by: FAMILY MEDICINE

## 2021-12-13 PROCEDURE — 90686 IIV4 VACC NO PRSV 0.5 ML IM: CPT | Performed by: FAMILY MEDICINE

## 2022-01-04 ENCOUNTER — LAB ENCOUNTER (OUTPATIENT)
Dept: LAB | Facility: HOSPITAL | Age: 42
End: 2022-01-04
Attending: FAMILY MEDICINE
Payer: COMMERCIAL

## 2022-01-04 ENCOUNTER — TELEMEDICINE (OUTPATIENT)
Dept: FAMILY MEDICINE CLINIC | Facility: CLINIC | Age: 42
End: 2022-01-04

## 2022-01-04 DIAGNOSIS — J02.9 SORE THROAT: ICD-10-CM

## 2022-01-04 DIAGNOSIS — J02.9 SORE THROAT: Primary | ICD-10-CM

## 2022-01-04 PROCEDURE — 87081 CULTURE SCREEN ONLY: CPT

## 2022-01-04 PROCEDURE — 99213 OFFICE O/P EST LOW 20 MIN: CPT | Performed by: FAMILY MEDICINE

## 2022-01-04 RX ORDER — AMOXICILLIN 875 MG/1
875 TABLET, COATED ORAL 2 TIMES DAILY
Qty: 20 TABLET | Refills: 0 | Status: SHIPPED | OUTPATIENT
Start: 2022-01-04 | End: 2022-01-14

## 2022-01-04 NOTE — PROGRESS NOTES
This visit is conducted using Telemedicine with live, interactive video and audio. Patient has been referred to the [facility] website at [email or web address] to review the yearly Consent to Treat document.     Patient understands and accepts financial res throat, denies ear pain  RESPIRATORY: denies shortness of breath, pos cough  CARDIOVASCULAR: denies chest pain      EXAM:   LMP 07/14/2021   GENERAL: well developed, well nourished,in no apparent distress  Speaking in complete sentences with no distress.  Lizandro Orozco

## 2022-01-06 LAB — SARS-COV-2 RNA RESP QL NAA+PROBE: DETECTED

## 2022-01-06 NOTE — PROGRESS NOTES
Positive covid test. Needs to quarantine for 5 days as she has been vaccinated. Should wear mask for 10 days total while around people.

## 2022-04-04 ENCOUNTER — TELEPHONE (OUTPATIENT)
Dept: FAMILY MEDICINE CLINIC | Facility: CLINIC | Age: 42
End: 2022-04-04

## 2022-04-04 NOTE — TELEPHONE ENCOUNTER
Per patient, she missed a call from Dr. Lisa Chowdhury office on 4/2. No communication found.  Please advise

## 2022-05-12 ENCOUNTER — NURSE TRIAGE (OUTPATIENT)
Dept: FAMILY MEDICINE CLINIC | Facility: CLINIC | Age: 42
End: 2022-05-12

## 2022-05-12 ENCOUNTER — OFFICE VISIT (OUTPATIENT)
Dept: FAMILY MEDICINE CLINIC | Facility: CLINIC | Age: 42
End: 2022-05-12
Payer: COMMERCIAL

## 2022-05-12 VITALS
HEIGHT: 60 IN | BODY MASS INDEX: 36.12 KG/M2 | WEIGHT: 184 LBS | HEART RATE: 85 BPM | DIASTOLIC BLOOD PRESSURE: 73 MMHG | SYSTOLIC BLOOD PRESSURE: 112 MMHG

## 2022-05-12 DIAGNOSIS — H65.93 FLUID LEVEL BEHIND TYMPANIC MEMBRANE OF BOTH EARS: ICD-10-CM

## 2022-05-12 DIAGNOSIS — H00.022 HORDEOLUM INTERNUM OF RIGHT LOWER EYELID: Primary | ICD-10-CM

## 2022-05-12 PROCEDURE — 3078F DIAST BP <80 MM HG: CPT | Performed by: NURSE PRACTITIONER

## 2022-05-12 PROCEDURE — 99213 OFFICE O/P EST LOW 20 MIN: CPT | Performed by: NURSE PRACTITIONER

## 2022-05-12 PROCEDURE — 3008F BODY MASS INDEX DOCD: CPT | Performed by: NURSE PRACTITIONER

## 2022-05-12 PROCEDURE — 3074F SYST BP LT 130 MM HG: CPT | Performed by: NURSE PRACTITIONER

## 2022-05-12 RX ORDER — POLYMYXIN B SULFATE AND TRIMETHOPRIM 1; 10000 MG/ML; [USP'U]/ML
1 SOLUTION OPHTHALMIC
Qty: 10 ML | Refills: 0 | Status: SHIPPED | OUTPATIENT
Start: 2022-05-12 | End: 2022-05-19

## 2022-08-02 ENCOUNTER — OFFICE VISIT (OUTPATIENT)
Dept: FAMILY MEDICINE CLINIC | Facility: CLINIC | Age: 42
End: 2022-08-02
Payer: COMMERCIAL

## 2022-08-02 VITALS
BODY MASS INDEX: 37.18 KG/M2 | SYSTOLIC BLOOD PRESSURE: 104 MMHG | DIASTOLIC BLOOD PRESSURE: 68 MMHG | WEIGHT: 189.38 LBS | HEIGHT: 60 IN | HEART RATE: 73 BPM

## 2022-08-02 DIAGNOSIS — L23.7 POISON IVY: Primary | ICD-10-CM

## 2022-08-02 PROCEDURE — 3078F DIAST BP <80 MM HG: CPT | Performed by: FAMILY MEDICINE

## 2022-08-02 PROCEDURE — 3008F BODY MASS INDEX DOCD: CPT | Performed by: FAMILY MEDICINE

## 2022-08-02 PROCEDURE — 96372 THER/PROPH/DIAG INJ SC/IM: CPT | Performed by: FAMILY MEDICINE

## 2022-08-02 PROCEDURE — 99213 OFFICE O/P EST LOW 20 MIN: CPT | Performed by: FAMILY MEDICINE

## 2022-08-02 PROCEDURE — 3074F SYST BP LT 130 MM HG: CPT | Performed by: FAMILY MEDICINE

## 2022-08-02 RX ORDER — METHYLPREDNISOLONE ACETATE 80 MG/ML
80 INJECTION, SUSPENSION INTRA-ARTICULAR; INTRALESIONAL; INTRAMUSCULAR; SOFT TISSUE ONCE
Status: COMPLETED | OUTPATIENT
Start: 2022-08-02 | End: 2022-08-02

## 2022-08-02 RX ORDER — CETIRIZINE HYDROCHLORIDE 10 MG/1
10 TABLET ORAL NIGHTLY PRN
Qty: 30 TABLET | Refills: 3 | Status: SHIPPED | OUTPATIENT
Start: 2022-08-02

## 2022-08-02 RX ORDER — MOMETASONE FUROATE 1 MG/G
CREAM TOPICAL
Qty: 30 G | Refills: 1 | Status: SHIPPED | OUTPATIENT
Start: 2022-08-02

## 2022-08-02 RX ORDER — ACETAMINOPHEN AND CODEINE PHOSPHATE 300; 30 MG/1; MG/1
1 TABLET ORAL EVERY 4 HOURS PRN
COMMUNITY
Start: 2022-05-23

## 2022-08-02 RX ADMIN — METHYLPREDNISOLONE ACETATE 80 MG: 80 INJECTION, SUSPENSION INTRA-ARTICULAR; INTRALESIONAL; INTRAMUSCULAR; SOFT TISSUE at 14:16:00

## 2022-08-23 ENCOUNTER — OFFICE VISIT (OUTPATIENT)
Dept: FAMILY MEDICINE CLINIC | Facility: CLINIC | Age: 42
End: 2022-08-23
Payer: COMMERCIAL

## 2022-08-23 VITALS
HEIGHT: 60 IN | SYSTOLIC BLOOD PRESSURE: 104 MMHG | BODY MASS INDEX: 36.75 KG/M2 | HEART RATE: 60 BPM | WEIGHT: 187.19 LBS | DIASTOLIC BLOOD PRESSURE: 70 MMHG

## 2022-08-23 DIAGNOSIS — L23.9 ALLERGIC DERMATITIS: Primary | ICD-10-CM

## 2022-08-23 PROCEDURE — 99213 OFFICE O/P EST LOW 20 MIN: CPT | Performed by: FAMILY MEDICINE

## 2022-08-23 PROCEDURE — 3008F BODY MASS INDEX DOCD: CPT | Performed by: FAMILY MEDICINE

## 2022-08-23 PROCEDURE — 3074F SYST BP LT 130 MM HG: CPT | Performed by: FAMILY MEDICINE

## 2022-08-23 PROCEDURE — 3078F DIAST BP <80 MM HG: CPT | Performed by: FAMILY MEDICINE

## 2022-09-15 ENCOUNTER — HOSPITAL ENCOUNTER (OUTPATIENT)
Age: 42
Discharge: HOME | End: 2022-09-15
Payer: COMMERCIAL

## 2022-09-15 VITALS
SYSTOLIC BLOOD PRESSURE: 124 MMHG | TEMPERATURE: 98 F | WEIGHT: 180 LBS | OXYGEN SATURATION: 100 % | HEIGHT: 60 IN | BODY MASS INDEX: 35.34 KG/M2 | RESPIRATION RATE: 18 BRPM | DIASTOLIC BLOOD PRESSURE: 75 MMHG | HEART RATE: 83 BPM

## 2022-09-15 DIAGNOSIS — R11.2 NAUSEA AND VOMITING IN ADULT: ICD-10-CM

## 2022-09-15 DIAGNOSIS — T30.4 CHEMICAL BURN: Primary | ICD-10-CM

## 2022-09-15 DIAGNOSIS — R10.9 ABDOMINAL PAIN, ACUTE: ICD-10-CM

## 2022-09-15 RX ORDER — ONDANSETRON 4 MG/1
4 TABLET, ORALLY DISINTEGRATING ORAL EVERY 6 HOURS PRN
Qty: 10 TABLET | Refills: 0 | Status: SHIPPED | OUTPATIENT
Start: 2022-09-15 | End: 2022-09-18

## 2022-09-15 RX ORDER — MAGNESIUM HYDROXIDE/ALUMINUM HYDROXICE/SIMETHICONE 120; 1200; 1200 MG/30ML; MG/30ML; MG/30ML
30 SUSPENSION ORAL ONCE
Status: COMPLETED | OUTPATIENT
Start: 2022-09-15 | End: 2022-09-15

## 2022-09-15 RX ORDER — ONDANSETRON 4 MG/1
4 TABLET, ORALLY DISINTEGRATING ORAL ONCE
Status: COMPLETED | OUTPATIENT
Start: 2022-09-15 | End: 2022-09-15

## 2022-09-15 RX ORDER — DICYCLOMINE HYDROCHLORIDE 10 MG/5ML
20 SOLUTION ORAL ONCE
Status: COMPLETED | OUTPATIENT
Start: 2022-09-15 | End: 2022-09-15

## 2022-09-15 RX ORDER — LIDOCAINE HYDROCHLORIDE 20 MG/ML
10 SOLUTION OROPHARYNGEAL ONCE
Status: COMPLETED | OUTPATIENT
Start: 2022-09-15 | End: 2022-09-15

## 2022-09-15 NOTE — ED INITIAL ASSESSMENT (HPI)
Pt here w c/o getting Drano in her face, chest while cleaning a drain. Pt states it landed on her skin through her clothes. States she washed her face immediately, denies vision changes, but states her lips feeling irritated, and has irritation to bilateral breasts and vomited 3 times immediately after. Last emesis at 12pm. Denies SOB, no trouble swallowing. Resp easy and nonlabored.

## 2022-09-15 NOTE — ED QUICK NOTES
Pt had wound care done, bilateral breast irrigated with saline per PA. Pt states she already took shower prior to arrival after incident with jakob. Has tolerated PO fluids well.

## 2022-12-01 NOTE — TELEPHONE ENCOUNTER
Pt will like to know if she received the tetanus shot and if she can get a copy of all her vaccines
TDAP was given to her,  copy of vaccine will be placed in ADO  for p/u.
Talked to pt told her msg below and understood.
Statement Selected

## 2023-01-31 ENCOUNTER — HOSPITAL ENCOUNTER (OUTPATIENT)
Age: 43
Discharge: HOME | End: 2023-01-31
Payer: COMMERCIAL

## 2023-01-31 VITALS
DIASTOLIC BLOOD PRESSURE: 75 MMHG | HEIGHT: 60 IN | OXYGEN SATURATION: 98 % | WEIGHT: 180 LBS | HEART RATE: 86 BPM | TEMPERATURE: 98 F | SYSTOLIC BLOOD PRESSURE: 120 MMHG | BODY MASS INDEX: 35.34 KG/M2 | RESPIRATION RATE: 24 BRPM

## 2023-01-31 DIAGNOSIS — T78.40XA ALLERGIC REACTION, INITIAL ENCOUNTER: Primary | ICD-10-CM

## 2023-01-31 DIAGNOSIS — R21 RASH: ICD-10-CM

## 2023-01-31 PROCEDURE — 99213 OFFICE O/P EST LOW 20 MIN: CPT | Performed by: PHYSICIAN ASSISTANT

## 2023-01-31 RX ORDER — LORATADINE 10 MG/1
10 TABLET ORAL DAILY
Qty: 10 TABLET | Refills: 0 | Status: SHIPPED | OUTPATIENT
Start: 2023-01-31 | End: 2023-02-10

## 2023-01-31 RX ORDER — DIPHENHYDRAMINE HCL 25 MG
CAPSULE ORAL EVERY 6 HOURS PRN
Qty: 40 CAPSULE | Refills: 0 | Status: SHIPPED | OUTPATIENT
Start: 2023-01-31 | End: 2023-02-02

## 2023-01-31 RX ORDER — PREDNISONE 20 MG/1
40 TABLET ORAL DAILY
Qty: 10 TABLET | Refills: 0 | Status: SHIPPED | OUTPATIENT
Start: 2023-01-31 | End: 2023-02-05

## 2023-01-31 NOTE — ED INITIAL ASSESSMENT (HPI)
Pt reports itchy rash that started on neck and now reporting full body rash. No SOB, wheezing, oral itchiness. No new medications, toiletries, detergents. Denies fevers.

## 2023-02-02 ENCOUNTER — OFFICE VISIT (OUTPATIENT)
Dept: FAMILY MEDICINE CLINIC | Facility: CLINIC | Age: 43
End: 2023-02-02

## 2023-02-02 VITALS
WEIGHT: 192.63 LBS | HEART RATE: 57 BPM | BODY MASS INDEX: 37.82 KG/M2 | HEIGHT: 60 IN | SYSTOLIC BLOOD PRESSURE: 108 MMHG | DIASTOLIC BLOOD PRESSURE: 72 MMHG

## 2023-02-02 DIAGNOSIS — L30.9 DERMATITIS: Primary | ICD-10-CM

## 2023-02-02 DIAGNOSIS — R10.13 DYSPEPSIA: ICD-10-CM

## 2023-02-02 DIAGNOSIS — L20.84 INTRINSIC ECZEMA: ICD-10-CM

## 2023-02-02 DIAGNOSIS — K59.01 SLOW TRANSIT CONSTIPATION: ICD-10-CM

## 2023-02-02 PROCEDURE — 3008F BODY MASS INDEX DOCD: CPT | Performed by: FAMILY MEDICINE

## 2023-02-02 PROCEDURE — 3074F SYST BP LT 130 MM HG: CPT | Performed by: FAMILY MEDICINE

## 2023-02-02 PROCEDURE — 3078F DIAST BP <80 MM HG: CPT | Performed by: FAMILY MEDICINE

## 2023-02-02 PROCEDURE — 99213 OFFICE O/P EST LOW 20 MIN: CPT | Performed by: FAMILY MEDICINE

## 2023-02-02 RX ORDER — MOMETASONE FUROATE 1 MG/G
CREAM TOPICAL
Qty: 30 G | Refills: 0 | Status: SHIPPED | OUTPATIENT
Start: 2023-02-02

## 2023-02-02 RX ORDER — SIMETHICONE 125 MG
250 TABLET,CHEWABLE ORAL EVERY 6 HOURS PRN
Qty: 60 TABLET | Refills: 0 | Status: SHIPPED | OUTPATIENT
Start: 2023-02-02

## 2023-02-02 RX ORDER — POLYETHYLENE GLYCOL 3350 17 G/17G
17 POWDER, FOR SOLUTION ORAL DAILY
Qty: 30 EACH | Refills: 0 | Status: SHIPPED | OUTPATIENT
Start: 2023-02-02

## 2023-02-22 ENCOUNTER — OFFICE VISIT (OUTPATIENT)
Dept: FAMILY MEDICINE CLINIC | Facility: CLINIC | Age: 43
End: 2023-02-22

## 2023-02-22 ENCOUNTER — TELEPHONE (OUTPATIENT)
Dept: FAMILY MEDICINE CLINIC | Facility: CLINIC | Age: 43
End: 2023-02-22

## 2023-02-22 VITALS
DIASTOLIC BLOOD PRESSURE: 75 MMHG | BODY MASS INDEX: 38.09 KG/M2 | WEIGHT: 194 LBS | HEIGHT: 60 IN | HEART RATE: 73 BPM | SYSTOLIC BLOOD PRESSURE: 109 MMHG

## 2023-02-22 DIAGNOSIS — N92.1 MENORRHAGIA WITH IRREGULAR CYCLE: ICD-10-CM

## 2023-02-22 DIAGNOSIS — H93.11 TINNITUS OF RIGHT EAR: Primary | ICD-10-CM

## 2023-02-22 DIAGNOSIS — H65.191 ACUTE EFFUSION OF RIGHT EAR: ICD-10-CM

## 2023-02-22 LAB
CUVETTE LOT #: ABNORMAL NUMERIC
HEMOGLOBIN: 11.3 G/DL (ref 12–16)

## 2023-02-22 PROCEDURE — 85018 HEMOGLOBIN: CPT | Performed by: FAMILY MEDICINE

## 2023-02-22 PROCEDURE — 3074F SYST BP LT 130 MM HG: CPT | Performed by: FAMILY MEDICINE

## 2023-02-22 PROCEDURE — 3078F DIAST BP <80 MM HG: CPT | Performed by: FAMILY MEDICINE

## 2023-02-22 PROCEDURE — 99213 OFFICE O/P EST LOW 20 MIN: CPT | Performed by: FAMILY MEDICINE

## 2023-02-22 PROCEDURE — 3008F BODY MASS INDEX DOCD: CPT | Performed by: FAMILY MEDICINE

## 2023-02-22 RX ORDER — DOXYCYCLINE 100 MG/1
100 TABLET ORAL 2 TIMES DAILY
Qty: 14 TABLET | Refills: 0 | Status: SHIPPED | OUTPATIENT
Start: 2023-02-22 | End: 2023-03-01

## 2023-02-22 RX ORDER — METHYLPREDNISOLONE 4 MG/1
TABLET ORAL
Qty: 1 EACH | Refills: 0 | Status: SHIPPED | OUTPATIENT
Start: 2023-02-22

## 2023-02-22 NOTE — TELEPHONE ENCOUNTER
Patient calling (identified name and )  Stating she keeps hearing constant buzzing in her ear, since , mostly in left ear but sometimes also in her right. Buzzing sounds like a speaker going \"boom, boom, boom\". No pain, no dizziness, no other symptoms. Denies any injury. Appointment made. Future Appointments   Date Time Provider Mireya Johnson   2023 11:30 AM Edgardo Stewart MD [facility]     Informed mask is required for building entry and appointment. Patient verbalized understanding and agrees with plan.

## 2023-03-01 ENCOUNTER — APPOINTMENT (OUTPATIENT)
Dept: CT IMAGING | Facility: HOSPITAL | Age: 43
End: 2023-03-01
Attending: EMERGENCY MEDICINE
Payer: COMMERCIAL

## 2023-03-01 ENCOUNTER — HOSPITAL ENCOUNTER (EMERGENCY)
Facility: HOSPITAL | Age: 43
Discharge: HOME | End: 2023-03-01
Attending: STUDENT IN AN ORGANIZED HEALTH CARE EDUCATION/TRAINING PROGRAM
Payer: COMMERCIAL

## 2023-03-01 VITALS
WEIGHT: 190 LBS | HEIGHT: 61 IN | RESPIRATION RATE: 20 BRPM | BODY MASS INDEX: 35.87 KG/M2 | HEART RATE: 66 BPM | DIASTOLIC BLOOD PRESSURE: 90 MMHG | SYSTOLIC BLOOD PRESSURE: 134 MMHG | TEMPERATURE: 98 F | OXYGEN SATURATION: 98 %

## 2023-03-01 DIAGNOSIS — H93.11 TINNITUS OF RIGHT EAR: ICD-10-CM

## 2023-03-01 DIAGNOSIS — G44.039 EPISODIC PAROXYSMAL HEMICRANIA, NOT INTRACTABLE: Primary | ICD-10-CM

## 2023-03-01 DIAGNOSIS — R14.0 ABDOMINAL BLOATING: ICD-10-CM

## 2023-03-01 LAB
ALBUMIN SERPL-MCNC: 3.4 G/DL (ref 3.4–5)
ALBUMIN/GLOB SERPL: 0.9 {RATIO} (ref 1–2)
ALP LIVER SERPL-CCNC: 77 U/L
ALT SERPL-CCNC: 39 U/L
ANION GAP SERPL CALC-SCNC: 5 MMOL/L (ref 0–18)
AST SERPL-CCNC: 25 U/L (ref 15–37)
BASOPHILS # BLD AUTO: 0.06 X10(3) UL (ref 0–0.2)
BASOPHILS NFR BLD AUTO: 0.5 %
BILIRUB SERPL-MCNC: 0.2 MG/DL (ref 0.1–2)
BILIRUB UR QL: NEGATIVE
BUN BLD-MCNC: 8 MG/DL (ref 7–18)
BUN/CREAT SERPL: 10.7 (ref 10–20)
CALCIUM BLD-MCNC: 9.3 MG/DL (ref 8.5–10.1)
CHLORIDE SERPL-SCNC: 106 MMOL/L (ref 98–112)
CLARITY UR: CLEAR
CO2 SERPL-SCNC: 29 MMOL/L (ref 21–32)
COLOR UR: YELLOW
CREAT BLD-MCNC: 0.75 MG/DL
DEPRECATED RDW RBC AUTO: 41.1 FL (ref 35.1–46.3)
EOSINOPHIL # BLD AUTO: 0.25 X10(3) UL (ref 0–0.7)
EOSINOPHIL NFR BLD AUTO: 2.3 %
ERYTHROCYTE [DISTWIDTH] IN BLOOD BY AUTOMATED COUNT: 16.5 % (ref 11–15)
GFR SERPLBLD BASED ON 1.73 SQ M-ARVRAT: 102 ML/MIN/1.73M2 (ref 60–?)
GLOBULIN PLAS-MCNC: 3.9 G/DL (ref 2.8–4.4)
GLUCOSE BLD-MCNC: 174 MG/DL (ref 70–99)
GLUCOSE UR-MCNC: NEGATIVE MG/DL
HCT VFR BLD AUTO: 37.2 %
HGB BLD-MCNC: 11.2 G/DL
HGB UR QL STRIP.AUTO: NEGATIVE
HYALINE CASTS #/AREA URNS AUTO: PRESENT /LPF
IMM GRANULOCYTES # BLD AUTO: 0.1 X10(3) UL (ref 0–1)
IMM GRANULOCYTES NFR BLD: 0.9 %
KETONES UR-MCNC: NEGATIVE MG/DL
LEUKOCYTE ESTERASE UR QL STRIP.AUTO: NEGATIVE
LYMPHOCYTES # BLD AUTO: 2.61 X10(3) UL (ref 1–4)
LYMPHOCYTES NFR BLD AUTO: 23.9 %
MCH RBC QN AUTO: 21.3 PG (ref 26–34)
MCHC RBC AUTO-ENTMCNC: 30.1 G/DL (ref 31–37)
MCV RBC AUTO: 70.7 FL
MONOCYTES # BLD AUTO: 0.48 X10(3) UL (ref 0.1–1)
MONOCYTES NFR BLD AUTO: 4.4 %
NEUTROPHILS # BLD AUTO: 7.44 X10 (3) UL (ref 1.5–7.7)
NEUTROPHILS # BLD AUTO: 7.44 X10(3) UL (ref 1.5–7.7)
NEUTROPHILS NFR BLD AUTO: 68 %
NITRITE UR QL STRIP.AUTO: NEGATIVE
OSMOLALITY SERPL CALC.SUM OF ELEC: 293 MOSM/KG (ref 275–295)
PH UR: 6 [PH] (ref 5–8)
PLATELET # BLD AUTO: 432 10(3)UL (ref 150–450)
POTASSIUM SERPL-SCNC: 4 MMOL/L (ref 3.5–5.1)
PROT SERPL-MCNC: 7.3 G/DL (ref 6.4–8.2)
PROT UR-MCNC: NEGATIVE MG/DL
RBC # BLD AUTO: 5.26 X10(6)UL
SARS-COV-2 RNA RESP QL NAA+PROBE: NOT DETECTED
SODIUM SERPL-SCNC: 140 MMOL/L (ref 136–145)
SP GR UR STRIP: 1.01 (ref 1–1.03)
UROBILINOGEN UR STRIP-ACNC: <2
VIT C UR-MCNC: NEGATIVE MG/DL
WBC # BLD AUTO: 10.9 X10(3) UL (ref 4–11)

## 2023-03-01 PROCEDURE — 80053 COMPREHEN METABOLIC PANEL: CPT | Performed by: STUDENT IN AN ORGANIZED HEALTH CARE EDUCATION/TRAINING PROGRAM

## 2023-03-01 PROCEDURE — 96361 HYDRATE IV INFUSION ADD-ON: CPT

## 2023-03-01 PROCEDURE — 96374 THER/PROPH/DIAG INJ IV PUSH: CPT

## 2023-03-01 PROCEDURE — 96375 TX/PRO/DX INJ NEW DRUG ADDON: CPT

## 2023-03-01 PROCEDURE — 99285 EMERGENCY DEPT VISIT HI MDM: CPT

## 2023-03-01 PROCEDURE — 99284 EMERGENCY DEPT VISIT MOD MDM: CPT

## 2023-03-01 PROCEDURE — 85025 COMPLETE CBC W/AUTO DIFF WBC: CPT | Performed by: STUDENT IN AN ORGANIZED HEALTH CARE EDUCATION/TRAINING PROGRAM

## 2023-03-01 PROCEDURE — 70450 CT HEAD/BRAIN W/O DYE: CPT | Performed by: EMERGENCY MEDICINE

## 2023-03-01 PROCEDURE — 81003 URINALYSIS AUTO W/O SCOPE: CPT | Performed by: STUDENT IN AN ORGANIZED HEALTH CARE EDUCATION/TRAINING PROGRAM

## 2023-03-01 RX ORDER — DIPHENHYDRAMINE HYDROCHLORIDE 50 MG/ML
12.5 INJECTION INTRAMUSCULAR; INTRAVENOUS ONCE
Status: COMPLETED | OUTPATIENT
Start: 2023-03-01 | End: 2023-03-01

## 2023-03-01 RX ORDER — DEXAMETHASONE SODIUM PHOSPHATE 4 MG/ML
10 VIAL (ML) INJECTION ONCE
Status: COMPLETED | OUTPATIENT
Start: 2023-03-01 | End: 2023-03-01

## 2023-03-01 RX ORDER — METOCLOPRAMIDE HYDROCHLORIDE 5 MG/ML
5 INJECTION INTRAMUSCULAR; INTRAVENOUS ONCE
Status: COMPLETED | OUTPATIENT
Start: 2023-03-01 | End: 2023-03-01

## 2023-03-02 ENCOUNTER — PATIENT OUTREACH (OUTPATIENT)
Dept: CASE MANAGEMENT | Age: 43
End: 2023-03-02

## 2023-03-02 NOTE — PROGRESS NOTES
1st attempt; pt had recent ED visit, calling to offer PCP f/u apt (dc 3/1)      Dr. Swapna Walker PCP---Pt has existing appt 3/7 @ 9:15 AM  [address]  [phone]      Dr. Js Ordoñez ENT---Office requires referral from PCP before scheduling  [address] [place] [address],[address]  [place] [address]  [phone]    Pt notified to contact Dr. Suresh Lopez office after Dr. Engle Hearing appt     Closing encounter

## 2023-03-07 ENCOUNTER — OFFICE VISIT (OUTPATIENT)
Dept: FAMILY MEDICINE CLINIC | Facility: CLINIC | Age: 43
End: 2023-03-07

## 2023-03-07 VITALS
SYSTOLIC BLOOD PRESSURE: 107 MMHG | DIASTOLIC BLOOD PRESSURE: 71 MMHG | HEART RATE: 69 BPM | HEIGHT: 60 IN | BODY MASS INDEX: 37.11 KG/M2 | WEIGHT: 189 LBS

## 2023-03-07 DIAGNOSIS — R51.9 NONINTRACTABLE EPISODIC HEADACHE, UNSPECIFIED HEADACHE TYPE: Primary | ICD-10-CM

## 2023-03-07 DIAGNOSIS — H93.11 TINNITUS OF RIGHT EAR: ICD-10-CM

## 2023-03-07 PROCEDURE — 3008F BODY MASS INDEX DOCD: CPT | Performed by: FAMILY MEDICINE

## 2023-03-07 PROCEDURE — 99213 OFFICE O/P EST LOW 20 MIN: CPT | Performed by: FAMILY MEDICINE

## 2023-03-07 PROCEDURE — 3074F SYST BP LT 130 MM HG: CPT | Performed by: FAMILY MEDICINE

## 2023-03-07 PROCEDURE — 3078F DIAST BP <80 MM HG: CPT | Performed by: FAMILY MEDICINE

## 2023-03-07 RX ORDER — IBUPROFEN 600 MG/1
600 TABLET ORAL EVERY 6 HOURS PRN
Qty: 60 TABLET | Refills: 0 | Status: SHIPPED | OUTPATIENT
Start: 2023-03-07

## 2023-03-07 RX ORDER — POLYETHYLENE GLYCOL 3350 17 G/17G
17 POWDER, FOR SOLUTION ORAL DAILY
Qty: 30 EACH | Refills: 0 | Status: SHIPPED | OUTPATIENT
Start: 2023-03-07

## 2023-06-05 ENCOUNTER — OFFICE VISIT (OUTPATIENT)
Dept: FAMILY MEDICINE CLINIC | Facility: CLINIC | Age: 43
End: 2023-06-05

## 2023-06-05 VITALS
HEART RATE: 68 BPM | WEIGHT: 189 LBS | DIASTOLIC BLOOD PRESSURE: 77 MMHG | HEIGHT: 60 IN | SYSTOLIC BLOOD PRESSURE: 119 MMHG | BODY MASS INDEX: 37.11 KG/M2

## 2023-06-05 DIAGNOSIS — Z00.00 PHYSICAL EXAM: Primary | ICD-10-CM

## 2023-06-05 PROCEDURE — 3074F SYST BP LT 130 MM HG: CPT | Performed by: FAMILY MEDICINE

## 2023-06-05 PROCEDURE — 3008F BODY MASS INDEX DOCD: CPT | Performed by: FAMILY MEDICINE

## 2023-06-05 PROCEDURE — 99396 PREV VISIT EST AGE 40-64: CPT | Performed by: FAMILY MEDICINE

## 2023-06-05 PROCEDURE — 3078F DIAST BP <80 MM HG: CPT | Performed by: FAMILY MEDICINE

## 2023-06-05 RX ORDER — TAMSULOSIN HYDROCHLORIDE 0.4 MG/1
0.4 CAPSULE ORAL
Qty: 30 CAPSULE | Refills: 0 | Status: CANCELLED | OUTPATIENT
Start: 2023-06-05

## 2023-06-05 RX ORDER — TAMSULOSIN HYDROCHLORIDE 0.4 MG/1
0.4 CAPSULE ORAL
COMMUNITY

## 2023-06-06 ENCOUNTER — EKG ENCOUNTER (OUTPATIENT)
Dept: LAB | Age: 43
End: 2023-06-06
Attending: FAMILY MEDICINE
Payer: COMMERCIAL

## 2023-06-06 ENCOUNTER — LAB ENCOUNTER (OUTPATIENT)
Dept: LAB | Age: 43
End: 2023-06-06
Attending: FAMILY MEDICINE
Payer: COMMERCIAL

## 2023-06-06 DIAGNOSIS — Z00.00 PHYSICAL EXAM: ICD-10-CM

## 2023-06-06 LAB
ALBUMIN SERPL-MCNC: 3.2 G/DL (ref 3.4–5)
ALBUMIN/GLOB SERPL: 0.9 {RATIO} (ref 1–2)
ALP LIVER SERPL-CCNC: 73 U/L
ALT SERPL-CCNC: 40 U/L
ANION GAP SERPL CALC-SCNC: 6 MMOL/L (ref 0–18)
AST SERPL-CCNC: 20 U/L (ref 15–37)
ATRIAL RATE: 56 BPM
BASOPHILS # BLD AUTO: 0.07 X10(3) UL (ref 0–0.2)
BASOPHILS NFR BLD AUTO: 0.7 %
BILIRUB SERPL-MCNC: 0.2 MG/DL (ref 0.1–2)
BILIRUB UR QL: NEGATIVE
BILIRUB UR QL: NEGATIVE
BUN BLD-MCNC: 14 MG/DL (ref 7–18)
BUN/CREAT SERPL: 20 (ref 10–20)
C TRACH DNA SPEC QL NAA+PROBE: NEGATIVE
CALCIUM BLD-MCNC: 8.7 MG/DL (ref 8.5–10.1)
CANCER AG125 SERPL-ACNC: 11.4 U/ML (ref ?–35)
CHLORIDE SERPL-SCNC: 109 MMOL/L (ref 98–112)
CHOLEST SERPL-MCNC: 129 MG/DL (ref ?–200)
CLARITY UR: CLEAR
CLARITY UR: CLEAR
CO2 SERPL-SCNC: 27 MMOL/L (ref 21–32)
CREAT BLD-MCNC: 0.7 MG/DL
DEPRECATED RDW RBC AUTO: 40.8 FL (ref 35.1–46.3)
EOSINOPHIL # BLD AUTO: 0.28 X10(3) UL (ref 0–0.7)
EOSINOPHIL NFR BLD AUTO: 2.7 %
ERYTHROCYTE [DISTWIDTH] IN BLOOD BY AUTOMATED COUNT: 16.3 % (ref 11–15)
EST. AVERAGE GLUCOSE BLD GHB EST-MCNC: 169 MG/DL (ref 68–126)
FASTING PATIENT LIPID ANSWER: YES
FASTING STATUS PATIENT QL REPORTED: YES
GFR SERPLBLD BASED ON 1.73 SQ M-ARVRAT: 111 ML/MIN/1.73M2 (ref 60–?)
GLOBULIN PLAS-MCNC: 3.7 G/DL (ref 2.8–4.4)
GLUCOSE BLD-MCNC: 153 MG/DL (ref 70–99)
GLUCOSE UR-MCNC: NORMAL MG/DL
GLUCOSE UR-MCNC: NORMAL MG/DL
HBA1C MFR BLD: 7.5 % (ref ?–5.7)
HBV SURFACE AB SER QL: NONREACTIVE
HBV SURFACE AB SERPL IA-ACNC: <3.1 MIU/ML
HCT VFR BLD AUTO: 35.5 %
HDLC SERPL-MCNC: 32 MG/DL (ref 40–59)
HGB BLD-MCNC: 10.4 G/DL
IMM GRANULOCYTES # BLD AUTO: 0.07 X10(3) UL (ref 0–1)
IMM GRANULOCYTES NFR BLD: 0.7 %
KETONES UR-MCNC: NEGATIVE MG/DL
KETONES UR-MCNC: NEGATIVE MG/DL
LDLC SERPL CALC-MCNC: 78 MG/DL (ref ?–100)
LEUKOCYTE ESTERASE UR QL STRIP.AUTO: 250
LEUKOCYTE ESTERASE UR QL STRIP.AUTO: 250
LYMPHOCYTES # BLD AUTO: 2.84 X10(3) UL (ref 1–4)
LYMPHOCYTES NFR BLD AUTO: 27.2 %
MCH RBC QN AUTO: 20.6 PG (ref 26–34)
MCHC RBC AUTO-ENTMCNC: 29.3 G/DL (ref 31–37)
MCV RBC AUTO: 70.3 FL
MONOCYTES # BLD AUTO: 0.47 X10(3) UL (ref 0.1–1)
MONOCYTES NFR BLD AUTO: 4.5 %
N GONORRHOEA DNA SPEC QL NAA+PROBE: NEGATIVE
NEUTROPHILS # BLD AUTO: 6.7 X10 (3) UL (ref 1.5–7.7)
NEUTROPHILS # BLD AUTO: 6.7 X10(3) UL (ref 1.5–7.7)
NEUTROPHILS NFR BLD AUTO: 64.2 %
NITRITE UR QL STRIP.AUTO: NEGATIVE
NITRITE UR QL STRIP.AUTO: NEGATIVE
NONHDLC SERPL-MCNC: 97 MG/DL (ref ?–130)
OSMOLALITY SERPL CALC.SUM OF ELEC: 298 MOSM/KG (ref 275–295)
P AXIS: 29 DEGREES
P-R INTERVAL: 124 MS
PH UR: 5 [PH] (ref 5–8)
PH UR: 5 [PH] (ref 5–8)
PLATELET # BLD AUTO: 426 10(3)UL (ref 150–450)
POTASSIUM SERPL-SCNC: 4.5 MMOL/L (ref 3.5–5.1)
PROT SERPL-MCNC: 6.9 G/DL (ref 6.4–8.2)
PROT UR-MCNC: NEGATIVE MG/DL
PROT UR-MCNC: NEGATIVE MG/DL
Q-T INTERVAL: 414 MS
QRS DURATION: 72 MS
QTC CALCULATION (BEZET): 399 MS
R AXIS: 22 DEGREES
RBC # BLD AUTO: 5.05 X10(6)UL
RUBV IGG SER QL: POSITIVE
RUBV IGG SER-ACNC: 396.3 IU/ML (ref 10–?)
SODIUM SERPL-SCNC: 142 MMOL/L (ref 136–145)
SP GR UR STRIP: 1.02 (ref 1–1.03)
SP GR UR STRIP: 1.02 (ref 1–1.03)
T AXIS: 31 DEGREES
TRIGL SERPL-MCNC: 100 MG/DL (ref 30–149)
TSI SER-ACNC: 2.06 MIU/ML (ref 0.36–3.74)
UROBILINOGEN UR STRIP-ACNC: NORMAL
UROBILINOGEN UR STRIP-ACNC: NORMAL
VENTRICULAR RATE: 56 BPM
VIT D+METAB SERPL-MCNC: 19.8 NG/ML (ref 30–100)
VLDLC SERPL CALC-MCNC: 16 MG/DL (ref 0–30)
WBC # BLD AUTO: 10.4 X10(3) UL (ref 4–11)

## 2023-06-06 PROCEDURE — 87086 URINE CULTURE/COLONY COUNT: CPT | Performed by: FAMILY MEDICINE

## 2023-06-06 PROCEDURE — 86765 RUBEOLA ANTIBODY: CPT

## 2023-06-06 PROCEDURE — 82306 VITAMIN D 25 HYDROXY: CPT

## 2023-06-06 PROCEDURE — 86735 MUMPS ANTIBODY: CPT

## 2023-06-06 PROCEDURE — 84443 ASSAY THYROID STIM HORMONE: CPT | Performed by: FAMILY MEDICINE

## 2023-06-06 PROCEDURE — 86304 IMMUNOASSAY TUMOR CA 125: CPT | Performed by: FAMILY MEDICINE

## 2023-06-06 PROCEDURE — 86762 RUBELLA ANTIBODY: CPT

## 2023-06-06 PROCEDURE — 83036 HEMOGLOBIN GLYCOSYLATED A1C: CPT | Performed by: FAMILY MEDICINE

## 2023-06-06 PROCEDURE — 86787 VARICELLA-ZOSTER ANTIBODY: CPT

## 2023-06-06 PROCEDURE — 80061 LIPID PANEL: CPT | Performed by: FAMILY MEDICINE

## 2023-06-06 PROCEDURE — 36415 COLL VENOUS BLD VENIPUNCTURE: CPT | Performed by: FAMILY MEDICINE

## 2023-06-06 PROCEDURE — 93010 ELECTROCARDIOGRAM REPORT: CPT | Performed by: INTERNAL MEDICINE

## 2023-06-06 PROCEDURE — 85025 COMPLETE CBC W/AUTO DIFF WBC: CPT | Performed by: FAMILY MEDICINE

## 2023-06-06 PROCEDURE — 86706 HEP B SURFACE ANTIBODY: CPT

## 2023-06-06 PROCEDURE — 93005 ELECTROCARDIOGRAM TRACING: CPT

## 2023-06-06 PROCEDURE — 80053 COMPREHEN METABOLIC PANEL: CPT | Performed by: FAMILY MEDICINE

## 2023-06-06 PROCEDURE — 81001 URINALYSIS AUTO W/SCOPE: CPT | Performed by: FAMILY MEDICINE

## 2023-06-06 RX ORDER — ERGOCALCIFEROL 1.25 MG/1
50000 CAPSULE ORAL WEEKLY
Qty: 12 CAPSULE | Refills: 4 | Status: SHIPPED | OUTPATIENT
Start: 2023-06-06 | End: 2023-07-06

## 2023-06-07 LAB
MEV IGG SER-ACNC: 85.5 AU/ML (ref 16.5–?)
MUV IGG SER IA-ACNC: 212 AU/ML (ref 11–?)
VZV IGG SER IA-ACNC: 23.49 (ref 165–?)

## 2023-06-09 ENCOUNTER — TELEPHONE (OUTPATIENT)
Dept: FAMILY MEDICINE CLINIC | Facility: CLINIC | Age: 43
End: 2023-06-09

## 2023-06-09 NOTE — TELEPHONE ENCOUNTER
Patient indicates she went to Dr. Pramod Gibbons office earlier today to pickup paperwork. Patient indicates she is missing her information of her vaccines she has received, Varicella,MMR, Rubilo, Chicken Pox,and Flu. Please call with  [phone],LEONOR.

## 2023-06-09 NOTE — TELEPHONE ENCOUNTER
Please advise does patient need vaccines? Titters were completed and pt requesting her immunizations/labs copy. Patient does not have a f/u appt till October.

## 2023-06-09 NOTE — TELEPHONE ENCOUNTER
Spoke, with the patient and informed her of the message below. Pt did schedule a nurse visit for 6-19-23. This was approved per the message below.

## 2023-06-14 LAB — HPV I/H RISK 1 DNA SPEC QL NAA+PROBE: NEGATIVE

## 2023-06-19 ENCOUNTER — NURSE ONLY (OUTPATIENT)
Dept: FAMILY MEDICINE CLINIC | Facility: CLINIC | Age: 43
End: 2023-06-19

## 2023-06-19 DIAGNOSIS — Z23 NEED FOR HEPATITIS VACCINATION: Primary | ICD-10-CM

## 2023-06-19 DIAGNOSIS — Z23 NEED FOR VARICELLA VACCINE: ICD-10-CM

## 2023-06-19 PROCEDURE — 90471 IMMUNIZATION ADMIN: CPT | Performed by: FAMILY MEDICINE

## 2023-06-19 PROCEDURE — 90716 VAR VACCINE LIVE SUBQ: CPT | Performed by: FAMILY MEDICINE

## 2023-06-19 PROCEDURE — 90746 HEPB VACCINE 3 DOSE ADULT IM: CPT | Performed by: FAMILY MEDICINE

## 2023-06-19 PROCEDURE — 90472 IMMUNIZATION ADMIN EACH ADD: CPT | Performed by: FAMILY MEDICINE

## 2023-06-19 NOTE — PROGRESS NOTES
Full name and  verified with patient. Patient is here in office for Hepatitis B & Varicella vaccine. Hepatitis B vaccine was given on the Right deltoid. Varicella vaccine was given on the Left deltoid. Patient tolerated well, no signs of adverse reaction. Patient asked for a copy of her labs that were done on 23. Vaccines were verified by [facility].

## 2023-08-08 ENCOUNTER — APPOINTMENT (OUTPATIENT)
Dept: GENERAL RADIOLOGY | Age: 43
End: 2023-08-08
Attending: NURSE PRACTITIONER
Payer: COMMERCIAL

## 2023-08-08 ENCOUNTER — HOSPITAL ENCOUNTER (OUTPATIENT)
Age: 43
Discharge: HOME | End: 2023-08-08
Payer: COMMERCIAL

## 2023-08-08 VITALS
HEART RATE: 70 BPM | BODY MASS INDEX: 33.99 KG/M2 | DIASTOLIC BLOOD PRESSURE: 70 MMHG | SYSTOLIC BLOOD PRESSURE: 116 MMHG | TEMPERATURE: 98 F | RESPIRATION RATE: 18 BRPM | WEIGHT: 180 LBS | OXYGEN SATURATION: 99 % | HEIGHT: 61 IN

## 2023-08-08 DIAGNOSIS — S92.425A CLOSED NONDISPLACED FRACTURE OF DISTAL PHALANX OF LEFT GREAT TOE, INITIAL ENCOUNTER: ICD-10-CM

## 2023-08-08 DIAGNOSIS — S99.922A INJURY OF TOE ON LEFT FOOT, INITIAL ENCOUNTER: Primary | ICD-10-CM

## 2023-08-08 PROCEDURE — 99213 OFFICE O/P EST LOW 20 MIN: CPT | Performed by: NURSE PRACTITIONER

## 2023-08-08 PROCEDURE — L3260 AMBULATORY SURGICAL BOOT EAC: HCPCS | Performed by: NURSE PRACTITIONER

## 2023-08-08 PROCEDURE — 73660 X-RAY EXAM OF TOE(S): CPT | Performed by: NURSE PRACTITIONER

## 2023-08-08 NOTE — ED INITIAL ASSESSMENT (HPI)
Pt in IC for c/o L foot pain after dropping piece of wood 8 days ago. States area hit L big toe and it was swollen and bruised, that has improved but still having pain. No other injury.

## 2023-08-22 ENCOUNTER — OFFICE VISIT (OUTPATIENT)
Dept: FAMILY MEDICINE CLINIC | Facility: CLINIC | Age: 43
End: 2023-08-22

## 2023-08-22 VITALS
BODY MASS INDEX: 35.46 KG/M2 | HEART RATE: 60 BPM | WEIGHT: 187.81 LBS | DIASTOLIC BLOOD PRESSURE: 69 MMHG | HEIGHT: 61 IN | SYSTOLIC BLOOD PRESSURE: 109 MMHG

## 2023-08-22 DIAGNOSIS — S92.425D CLOSED NONDISPLACED FRACTURE OF DISTAL PHALANX OF LEFT GREAT TOE WITH ROUTINE HEALING, SUBSEQUENT ENCOUNTER: Primary | ICD-10-CM

## 2023-08-22 PROCEDURE — 3008F BODY MASS INDEX DOCD: CPT | Performed by: FAMILY MEDICINE

## 2023-08-22 PROCEDURE — 99213 OFFICE O/P EST LOW 20 MIN: CPT | Performed by: FAMILY MEDICINE

## 2023-08-22 PROCEDURE — 3078F DIAST BP <80 MM HG: CPT | Performed by: FAMILY MEDICINE

## 2023-08-22 PROCEDURE — 3074F SYST BP LT 130 MM HG: CPT | Performed by: FAMILY MEDICINE

## 2023-08-29 ENCOUNTER — HOSPITAL ENCOUNTER (OUTPATIENT)
Dept: MAMMOGRAPHY | Facility: HOSPITAL | Age: 43
Discharge: HOME | End: 2023-08-29
Attending: FAMILY MEDICINE
Payer: COMMERCIAL

## 2023-08-29 DIAGNOSIS — Z00.00 PHYSICAL EXAM: ICD-10-CM

## 2023-08-29 PROCEDURE — 77063 BREAST TOMOSYNTHESIS BI: CPT | Performed by: FAMILY MEDICINE

## 2023-08-29 PROCEDURE — 77067 SCR MAMMO BI INCL CAD: CPT | Performed by: FAMILY MEDICINE

## 2023-09-12 ENCOUNTER — TELEPHONE (OUTPATIENT)
Dept: FAMILY MEDICINE CLINIC | Facility: CLINIC | Age: 43
End: 2023-09-12

## 2023-09-12 DIAGNOSIS — Z00.00 PHYSICAL EXAM: Primary | ICD-10-CM

## 2023-10-05 ENCOUNTER — OFFICE VISIT (OUTPATIENT)
Dept: FAMILY MEDICINE CLINIC | Facility: CLINIC | Age: 43
End: 2023-10-05

## 2023-10-05 VITALS — HEIGHT: 61 IN | WEIGHT: 189.38 LBS | BODY MASS INDEX: 35.75 KG/M2

## 2023-10-05 DIAGNOSIS — F51.01 PRIMARY INSOMNIA: ICD-10-CM

## 2023-10-05 DIAGNOSIS — M79.605 PAIN IN BOTH LOWER EXTREMITIES: Primary | ICD-10-CM

## 2023-10-05 DIAGNOSIS — M79.604 PAIN IN BOTH LOWER EXTREMITIES: Primary | ICD-10-CM

## 2023-10-05 DIAGNOSIS — K59.01 SLOW TRANSIT CONSTIPATION: ICD-10-CM

## 2023-10-05 PROCEDURE — 90471 IMMUNIZATION ADMIN: CPT | Performed by: FAMILY MEDICINE

## 2023-10-05 PROCEDURE — 90686 IIV4 VACC NO PRSV 0.5 ML IM: CPT | Performed by: FAMILY MEDICINE

## 2023-10-05 PROCEDURE — 99213 OFFICE O/P EST LOW 20 MIN: CPT | Performed by: FAMILY MEDICINE

## 2023-10-05 PROCEDURE — 3008F BODY MASS INDEX DOCD: CPT | Performed by: FAMILY MEDICINE

## 2023-10-05 RX ORDER — IBUPROFEN/PSEUDOEPHEDRINE HCL 200MG-30MG
1 TABLET ORAL NIGHTLY PRN
Qty: 100 TABLET | Refills: 1 | Status: SHIPPED | OUTPATIENT
Start: 2023-10-05

## 2023-10-05 RX ORDER — ASPIRIN 81 MG/1
81 TABLET ORAL DAILY
Qty: 100 TABLET | Refills: 0 | Status: SHIPPED | OUTPATIENT
Start: 2023-10-05

## 2023-10-05 RX ORDER — ERGOCALCIFEROL 1.25 MG/1
50000 CAPSULE ORAL WEEKLY
COMMUNITY
Start: 2023-09-18

## 2024-02-05 ENCOUNTER — OFFICE VISIT (OUTPATIENT)
Dept: FAMILY MEDICINE CLINIC | Facility: CLINIC | Age: 44
End: 2024-02-05
Payer: COMMERCIAL

## 2024-02-05 VITALS
BODY MASS INDEX: 35.65 KG/M2 | WEIGHT: 188.81 LBS | SYSTOLIC BLOOD PRESSURE: 112 MMHG | DIASTOLIC BLOOD PRESSURE: 71 MMHG | HEART RATE: 67 BPM | HEIGHT: 61 IN

## 2024-02-05 DIAGNOSIS — R53.83 FATIGUE, UNSPECIFIED TYPE: ICD-10-CM

## 2024-02-05 DIAGNOSIS — M77.12 LATERAL EPICONDYLITIS OF LEFT ELBOW: ICD-10-CM

## 2024-02-05 DIAGNOSIS — L60.3 THIN NAILS: Primary | ICD-10-CM

## 2024-02-05 RX ORDER — CYANOCOBALAMIN 1000 UG/ML
1000 INJECTION, SOLUTION INTRAMUSCULAR; SUBCUTANEOUS ONCE
Status: COMPLETED | OUTPATIENT
Start: 2024-02-05 | End: 2024-02-05

## 2024-02-05 RX ADMIN — CYANOCOBALAMIN 1000 MCG: 1000 INJECTION, SOLUTION INTRAMUSCULAR; SUBCUTANEOUS at 15:34:00

## 2024-02-05 NOTE — PROGRESS NOTES
2024  3:14 PM    Sarah Ying is a 43 year old female.    Chief complaint(s):   Chief Complaint   Patient presents with    Elbow Pain     X1 month , left elbow     Fatigue    Nail Care     HPI:     Sarah Ying primary complaint is regarding multiple complaints.     Patient is a 43-year-old female who presents complaining of left lateral epicondyle pain for the past month.  Denies any trauma accidents or injuries.  Pain tends to get worse when she becomes things like buckets.  Denies any paresthesia to the left upper extremity.    In addition she is complaining of fatigue and is mostly just feeling tired but reports that her sleeping has been disturbed due to the fact that she is going through a lot of stress with her children.  There is no snoring and denies any weakness.    In addition just complaining having thin nails but no history of onychomycosis.  It involves mostly all of her fingernails.    HISTORY:  Past Medical History:   Diagnosis Date    Allergic reaction     Unknown cause, ER visit 3/8/18.        Past Surgical History:   Procedure Laterality Date               TUBAL LIGATION        No family history on file.   Social History:   Social History     Socioeconomic History    Marital status:    Tobacco Use    Smoking status: Never    Smokeless tobacco: Never   Vaping Use    Vaping Use: Never used   Substance and Sexual Activity    Alcohol use: No    Drug use: No        Immunizations:   Immunization History   Administered Date(s) Administered    Covid-19 Vaccine [employer] (J&J) 0.5ml 2021    FLULAVAL 6 months & older 0.5 ml Prefilled syringe (69944) 2020, 10/29/2020, 2021    FLUZONE 6 months and older PFS 0.5 ml (73071) 10/05/2023    HEP B, Adult 2023    TDAP 2019    Varicella Vaccine 2023       Medications (Active prior to today's visit):  Current Outpatient Medications   Medication Sig Dispense Refill    diclofenac 1 % External  Gel Apply 4 g topically 4 (four) times daily. Apply to affected area(s). 60 g 2    ergocalciferol 1.25 MG (52976 UT) Oral Cap Take 1 capsule (50,000 Units total) by mouth once a week. (Patient not taking: Reported on 2/5/2024)      aspirin (ECOTRIN LOW STRENGTH) 81 MG Oral Tab EC Take 1 tablet (81 mg total) by mouth daily. (Patient not taking: Reported on 2/5/2024) 100 tablet 0    Psyllium 58.6 % Oral Powder Take 2 tablespoons in 6 oz of water at nightly. (Patient not taking: Reported on 2/5/2024) 660 g 7    Melatonin 3 MG Oral Tablet Dispersible Take 1 tablet by mouth nightly as needed. (Patient not taking: Reported on 2/5/2024) 100 tablet 1    Polyethylene Glycol 3350 (MIRALAX) 17 g Oral Powd Pack Take 17 g by mouth daily. (Patient not taking: Reported on 6/5/2023) 30 each 0    simethicone 125 MG Oral Chew Tab Chew 2 tablets (250 mg total) by mouth every 6 (six) hours as needed for FLATULENCE. (Patient not taking: Reported on 2/22/2023) 60 tablet 0       Allergies:  No Known Allergies      ROS:   Review of Systems   Constitutional:  Positive for fatigue. Negative for appetite change and fever.   Eyes:  Negative for visual disturbance.   Respiratory:  Negative for shortness of breath.    Cardiovascular:  Negative for chest pain.   Gastrointestinal:  Negative for abdominal pain, nausea and vomiting.   Musculoskeletal:  Negative for back pain.        Left elbow pain   Skin:  Negative for rash.        Thin nails   Neurological:  Negative for dizziness and headaches.   Psychiatric/Behavioral:  Negative for decreased concentration. The patient is not nervous/anxious.        PHYSICAL EXAM:   VS: /71   Pulse 67   Ht 5' 1\" (1.549 m)   Wt 188 lb 12.8 oz (85.6 kg)   LMP 09/20/2023 (Within Days)   BMI 35.67 kg/m²     Physical Exam  Vitals reviewed.   Constitutional:       General: She is not in acute distress.     Appearance: Normal appearance.   HENT:      Head: Normocephalic.   Eyes:      Conjunctiva/sclera:  Conjunctivae normal.   Cardiovascular:      Rate and Rhythm: Normal rate.   Pulmonary:      Effort: Pulmonary effort is normal.   Musculoskeletal:      Cervical back: Neck supple.      Comments: Mild Left lateral epicondyle tenderness    Skin:     Findings: No rash.   Psychiatric:         Mood and Affect: Mood normal.         LABORATORY RESULTS:     EKG / Spirometry : -     Radiology: No results found.     ASSESSMENT/PLAN:   Assessment   Encounter Diagnoses   Name Primary?    Thin nails Yes    Lateral epicondylitis of left elbow     Fatigue, unspecified type        1. Thin nails    Use OTC nail hardener    2. Lateral epicondylitis of left elbow    MEDICATIONS:     Requested Prescriptions     Signed Prescriptions Disp Refills    diclofenac 1 % External Gel 60 g 2     Sig: Apply 4 g topically 4 (four) times daily. Apply to affected area(s).        RECOMMENDATIONS given include: Patient was reassured of  her medical condition and all questions and concerns were answered. Patient was informed to please, call our office with any new or further questions or concerns that may come up in the near future. Notify Dr Augustin or the [facility] if there is a deterioration or worsening of the medical condition. Also, inform the doctor with any new symptoms or medications' side effects.      FOLLOW-UP: Schedule a follow-up visit in  prn.         3. Fatigue, unspecified type    Exercise  Eight loss plan  Rx given  - cyanocobalamin (Vitamin B12) 1000 MCG/ML injection 1,000 mcg   RTC prn       Orders This Visit:  No orders of the defined types were placed in this encounter.      Meds This Visit:  Requested Prescriptions     Signed Prescriptions Disp Refills    diclofenac 1 % External Gel 60 g 2     Sig: Apply 4 g topically 4 (four) times daily. Apply to affected area(s).       Imaging & Referrals:  None         WILLA AUGUSTIN MD

## 2024-03-25 ENCOUNTER — OFFICE VISIT (OUTPATIENT)
Dept: FAMILY MEDICINE CLINIC | Facility: CLINIC | Age: 44
End: 2024-03-25

## 2024-03-25 VITALS
HEIGHT: 61 IN | WEIGHT: 189 LBS | BODY MASS INDEX: 35.68 KG/M2 | SYSTOLIC BLOOD PRESSURE: 117 MMHG | HEART RATE: 76 BPM | DIASTOLIC BLOOD PRESSURE: 75 MMHG

## 2024-03-25 DIAGNOSIS — M77.12 LATERAL EPICONDYLITIS OF LEFT ELBOW: Primary | ICD-10-CM

## 2024-03-25 DIAGNOSIS — R53.83 FATIGUE, UNSPECIFIED TYPE: ICD-10-CM

## 2024-03-25 PROCEDURE — 3008F BODY MASS INDEX DOCD: CPT | Performed by: FAMILY MEDICINE

## 2024-03-25 PROCEDURE — 99213 OFFICE O/P EST LOW 20 MIN: CPT | Performed by: FAMILY MEDICINE

## 2024-03-25 PROCEDURE — 96372 THER/PROPH/DIAG INJ SC/IM: CPT | Performed by: FAMILY MEDICINE

## 2024-03-25 PROCEDURE — 3074F SYST BP LT 130 MM HG: CPT | Performed by: FAMILY MEDICINE

## 2024-03-25 PROCEDURE — 3078F DIAST BP <80 MM HG: CPT | Performed by: FAMILY MEDICINE

## 2024-03-25 RX ORDER — CYANOCOBALAMIN 1000 UG/ML
1000 INJECTION, SOLUTION INTRAMUSCULAR; SUBCUTANEOUS ONCE
Status: COMPLETED | OUTPATIENT
Start: 2024-03-25 | End: 2024-03-25

## 2024-03-25 RX ADMIN — CYANOCOBALAMIN 1000 MCG: 1000 INJECTION, SOLUTION INTRAMUSCULAR; SUBCUTANEOUS at 13:19:00

## 2024-03-25 NOTE — PROGRESS NOTES
3/25/2024  1:08 PM    Sarah Ying is a 43 year old female.    Chief complaint(s):   Chief Complaint   Patient presents with    Follow - Up     On left upper arm pain.    fatigue  HPI:     Sarah Ying primary complaint is regarding as above.     Patient is a 43-year-old female who presents  for follow up regarding left lateral epicondyle pain which has improved with Voltaren gel.  Denies any trauma accidents or injuries.  Pain tends to get worse when she becomes things like buckets.  Denies any paresthesia to the left upper extremity.    In addition she is complaining of fatigue and is mostly just feeling tired but reports that her sleeping has been disturbed due to the fact that she is going through a lot of stress with her children. Report that vit B12 shot help last visit.        HISTORY:  Past Medical History:   Diagnosis Date    Allergic reaction     Unknown cause, ER visit 3/8/18.        Past Surgical History:   Procedure Laterality Date               TUBAL LIGATION        No family history on file.   Social History:   Social History     Socioeconomic History    Marital status:    Tobacco Use    Smoking status: Never    Smokeless tobacco: Never   Vaping Use    Vaping Use: Never used   Substance and Sexual Activity    Alcohol use: No    Drug use: No        Immunizations:   Immunization History   Administered Date(s) Administered    Covid-19 Vaccine [employer] (J&J) 0.5ml 2021    FLULAVAL 6 months & older 0.5 ml Prefilled syringe (72135) 2020, 10/29/2020, 2021    FLUZONE 6 months and older PFS 0.5 ml (48049) 10/05/2023    HEP B, Adult 2023    TDAP 2019    Varicella Vaccine 2023       Medications (Active prior to today's visit):  Current Outpatient Medications   Medication Sig Dispense Refill    diclofenac 1 % External Gel Apply 4 g topically 4 (four) times daily. Apply to affected area(s). 60 g 2    ergocalciferol 1.25 MG (28744 UT) Oral  Cap Take 1 capsule (50,000 Units total) by mouth once a week. (Patient not taking: Reported on 2/5/2024)      aspirin (ECOTRIN LOW STRENGTH) 81 MG Oral Tab EC Take 1 tablet (81 mg total) by mouth daily. (Patient not taking: Reported on 2/5/2024) 100 tablet 0    Psyllium 58.6 % Oral Powder Take 2 tablespoons in 6 oz of water at nightly. (Patient not taking: Reported on 2/5/2024) 660 g 7    Melatonin 3 MG Oral Tablet Dispersible Take 1 tablet by mouth nightly as needed. (Patient not taking: Reported on 2/5/2024) 100 tablet 1    Polyethylene Glycol 3350 (MIRALAX) 17 g Oral Powd Pack Take 17 g by mouth daily. (Patient not taking: Reported on 6/5/2023) 30 each 0    simethicone 125 MG Oral Chew Tab Chew 2 tablets (250 mg total) by mouth every 6 (six) hours as needed for FLATULENCE. (Patient not taking: Reported on 2/22/2023) 60 tablet 0       Allergies:  No Known Allergies      ROS:   Review of Systems   Constitutional:  Positive for fatigue. Negative for appetite change and fever.   Eyes:  Negative for visual disturbance.   Respiratory:  Negative for shortness of breath.    Cardiovascular:  Negative for chest pain.   Gastrointestinal:  Negative for abdominal pain, nausea and vomiting.   Musculoskeletal:  Negative for back pain.   Skin:  Negative for rash.   Neurological:  Negative for dizziness and headaches.       PHYSICAL EXAM:   VS: /75 (BP Location: Right arm, Patient Position: Sitting, Cuff Size: adult)   Pulse 76   Ht 5' 1\" (1.549 m)   Wt 189 lb (85.7 kg)   LMP 09/20/2023 (Within Days)   BMI 35.71 kg/m²     Physical Exam  Vitals reviewed.   Constitutional:       General: She is not in acute distress.     Appearance: Normal appearance.   HENT:      Head: Normocephalic.   Eyes:      Conjunctiva/sclera: Conjunctivae normal.   Cardiovascular:      Rate and Rhythm: Normal rate.   Pulmonary:      Effort: Pulmonary effort is normal.   Musculoskeletal:      Cervical back: Neck supple.   Skin:     Findings: No  rash.   Psychiatric:         Mood and Affect: Mood normal.         LABORATORY RESULTS:     EKG / Spirometry : -     Radiology: No results found.     ASSESSMENT/PLAN:   Assessment   Encounter Diagnoses   Name Primary?    Lateral epicondylitis of left elbow Yes    Fatigue, unspecified type        1. Lateral epicondylitis of left elbow    Doing well   Much improved  CPM  Follow up prn    2. Fatigue, unspecified type    MEDICATIONS:  Vit B 12, IM given   RECOMMENDATIONS given include: Patient was reassured of  her medical condition and all questions and concerns were answered. Patient was informed to please, call our office with any new or further questions or concerns that may come up in the near future. Notify Dr Augustin or the [facility] if there is a deterioration or worsening of the medical condition. Also, inform the doctor with any new symptoms or medications' side effects.  Exercise daily, well balance diet.     FOLLOW-UP: Schedule a follow-up visit in  prn.             Orders This Visit:  No orders of the defined types were placed in this encounter.      Meds This Visit:  Requested Prescriptions      No prescriptions requested or ordered in this encounter       Imaging & Referrals:  None         WILLA AUGUSTIN MD

## 2024-06-25 ENCOUNTER — HOSPITAL ENCOUNTER (OUTPATIENT)
Age: 44
Discharge: HOME | End: 2024-06-25

## 2024-06-25 ENCOUNTER — APPOINTMENT (OUTPATIENT)
Dept: GENERAL RADIOLOGY | Age: 44
End: 2024-06-25
Attending: NURSE PRACTITIONER

## 2024-06-25 VITALS
SYSTOLIC BLOOD PRESSURE: 122 MMHG | HEART RATE: 67 BPM | OXYGEN SATURATION: 98 % | TEMPERATURE: 98 F | DIASTOLIC BLOOD PRESSURE: 62 MMHG | RESPIRATION RATE: 16 BRPM

## 2024-06-25 DIAGNOSIS — M54.50 ACUTE RIGHT-SIDED LOW BACK PAIN WITHOUT SCIATICA: ICD-10-CM

## 2024-06-25 DIAGNOSIS — M25.551 PAIN OF RIGHT HIP: Primary | ICD-10-CM

## 2024-06-25 PROCEDURE — 99213 OFFICE O/P EST LOW 20 MIN: CPT | Performed by: NURSE PRACTITIONER

## 2024-06-25 PROCEDURE — 72100 X-RAY EXAM L-S SPINE 2/3 VWS: CPT | Performed by: NURSE PRACTITIONER

## 2024-06-25 PROCEDURE — 96372 THER/PROPH/DIAG INJ SC/IM: CPT | Performed by: NURSE PRACTITIONER

## 2024-06-25 PROCEDURE — 73502 X-RAY EXAM HIP UNI 2-3 VIEWS: CPT | Performed by: NURSE PRACTITIONER

## 2024-06-25 RX ORDER — CYCLOBENZAPRINE HCL 10 MG
10 TABLET ORAL 2 TIMES DAILY PRN
Qty: 6 TABLET | Refills: 0 | Status: SHIPPED | OUTPATIENT
Start: 2024-06-25

## 2024-06-25 RX ORDER — KETOROLAC TROMETHAMINE 30 MG/ML
30 INJECTION, SOLUTION INTRAMUSCULAR; INTRAVENOUS ONCE
Status: COMPLETED | OUTPATIENT
Start: 2024-06-25 | End: 2024-06-25

## 2024-06-25 NOTE — ED PROVIDER NOTES
Patient Seen in: [facility]      History     Chief Complaint   Patient presents with    Hip Pain     Stated Complaint: pain in rt hip    Subjective:   HPI    This is a 44-year-old Senegalese-speaking female presenting with right-sided lower back and hip pain.   was offered but patient declined requesting that daughter at bedside do interpretation.  Patient's daughter at bedside aiding in history states on Friday she was helping carry a dryer up the stairs and manipulating it different ways since then she has been having right-sided low back pain and hip pain.  Patient's daughter states she has taken over-the-counter pain medication that has helped some but she still has some pain with movement.  Denies falling and landing on the back or the hip.  Denies any urinary symptoms bowel or bladder incontinence or saddle paresthesia.    Objective:   Past Medical History:    Allergic reaction    Unknown cause, ER visit 3/8/18.                Past Surgical History:   Procedure Laterality Date               Tubal ligation                  Social History     Socioeconomic History    Marital status:    Tobacco Use    Smoking status: Never    Smokeless tobacco: Never   Vaping Use    Vaping status: Never Used   Substance and Sexual Activity    Alcohol use: No    Drug use: No              Review of Systems    Positive for stated Chief Complaint: Hip Pain    Other systems are as noted in HPI.  Constitutional and vital signs reviewed.      All other systems reviewed and negative except as noted above.    Physical Exam     ED Triage Vitals [24 1120]   /62   Pulse 67   Resp 16   Temp 97.6 °F (36.4 °C)   Temp src Temporal   SpO2 98 %   O2 Device None (Room air)       Current Vitals:   Vital Signs  BP: 122/62  Pulse: 67  Resp: 16  Temp: 97.6 °F (36.4 °C)  Temp src: Temporal    Oxygen Therapy  SpO2: 98 %  O2 Device: None (Room air)            Physical Exam  Vitals and  nursing note reviewed.   Constitutional:       Appearance: Normal appearance.   HENT:      Right Ear: External ear normal.      Left Ear: External ear normal.      Nose: Nose normal.      Mouth/Throat:      Mouth: Mucous membranes are moist.      Pharynx: Oropharynx is clear.   Eyes:      Conjunctiva/sclera: Conjunctivae normal.   Cardiovascular:      Rate and Rhythm: Normal rate.   Pulmonary:      Effort: Pulmonary effort is normal.   Abdominal:      Palpations: Abdomen is soft.      Tenderness: There is no abdominal tenderness. There is no right CVA tenderness or left CVA tenderness.   Musculoskeletal:         General: Normal range of motion.      Cervical back: Normal range of motion.        Back:         Legs:       Comments: Cervical thoracic lumbar spine no midline TTP or step-offs   Skin:     General: Skin is warm.      Capillary Refill: Capillary refill takes less than 2 seconds.   Neurological:      Mental Status: She is alert and oriented to person, place, and time.               ED Course   Labs Reviewed - No data to display     XR LUMBAR SPINE (MIN 2 VIEWS) (CPT=72100)    Result Date: 6/25/2024  CONCLUSION:  1. No acute appearing fracture or subluxation.  Questionable minimal mid lumbar levoscoliosis.  Sacralization of the left aspect of the L5 vertebral body.  No significant lumbar spine spondylosis.    Dictated by (CST): Pj Mott MD on 6/25/2024 at 12:03 PM     Finalized by (CST): Pj Mott MD on 6/25/2024 at 12:04 PM          XR HIP W OR WO PELVIS 2 OR 3 VIEWS, RIGHT (CPT=73502)    Result Date: 6/25/2024  CONCLUSION:  1. Normal right hip.    Dictated by (CST): Pj Mott MD on 6/25/2024 at 12:02 PM     Finalized by (CST): Pj Mott MD on 6/25/2024 at 12:03 PM             MDM            Medical Decision Making  44-year-old female well-appearing and nontoxic presenting with right hip and right low back pain after lifting a dryer.  Pain is likely musculoskeletal as reproducible.  DDx  hip contusion versus strain or sprain lumbar strain versus acute back pain with sciatica.  Discussed collecting [facility] lumbar spine and hip x-ray with patient and family at bedside.  Patient is declining the [facility] states that she is not pregnant she has had a tubal ligation has no concern for pregnancy and will sign the forms to just get the imaging without the pregnancy test.    X-rays independently reviewed by this provider no fractures noted    Discussed results with patient and family at bedside discussed prescribing muscle relaxer discussed side effects of this medication discussed resuming over-the-counter ibuprofen and how much to take after 6 PM as she received Toradol here in [facility] discussed over-the-counter topical lidocaine patches outpatient follow-up with physiatry resource provided and strict ER precautions with any new or worsening symptoms.  Patient feels comfortable with the plan of care and acknowledges understanding discharge instructions.        Problems Addressed:  Acute right-sided low back pain without sciatica: acute illness or injury  Pain of right hip: acute illness or injury    Amount and/or Complexity of Data Reviewed  Radiology: ordered and independent interpretation performed. Decision-making details documented in ED Course.    Risk  OTC drugs.  Prescription drug management.        Disposition and Plan     Clinical Impression:  1. Pain of right hip    2. Acute right-sided low back pain without sciatica         Disposition:  Discharge  6/25/2024 12:09 pm    Follow-up:  Henrik Zepeda MD  [address] [facility] [address]  [place] [address]  [phone]    Call   Resource for physiatry    Gideon Erickson MD  [address] [place] [address]  [phone]                Medications Prescribed:  Discharge Medication List as of 6/25/2024 12:10 PM        START taking these medications    Details   cyclobenzaprine 10 MG Oral Tab Take 1 tablet (10 mg total) by mouth 2 (two) times daily as  needed for Muscle spasms (may cause drowsiness no driving or operating machinery while taking this medication)., Normal, Disp-6 tablet, R-0

## 2024-06-25 NOTE — DISCHARGE INSTRUCTIONS
After 6 PM you may continue over-the-counter ibuprofen take 2 tablets every 6-8 hours with food on the stomach for pain you may purchase over-the-counter lidocaine patches and gum continue lidocaine patches muscle relaxer that is being prescribed may cause drowsiness no driving or operating machinery while taking this medication.  You may alternate heat or ice to area of discomfort.  Call to set up an appointment with the physiatrist.  If you develop worsening pain inability to ambulate numbness or tingling to the extremity unintentionally urinate or have a bowel movement on yourself or any new or worsening symptoms go to the nearest emergency department.

## 2024-06-25 NOTE — ED INITIAL ASSESSMENT (HPI)
Pt presents to the IC with c/o losing her balance on Friday while carrying a dryer on stairs causing right hip pain. Pain is only with movement and has been helped by ibuprofen. No fall reported.

## 2024-07-10 ENCOUNTER — OFFICE VISIT (OUTPATIENT)
Dept: PHYSICAL MEDICINE AND REHAB | Facility: CLINIC | Age: 44
End: 2024-07-10
Payer: COMMERCIAL

## 2024-07-10 DIAGNOSIS — S39.012A ACUTE MYOFASCIAL STRAIN OF LUMBAR REGION, INITIAL ENCOUNTER: Primary | ICD-10-CM

## 2024-07-10 DIAGNOSIS — S46.911A SHOULDER STRAIN, RIGHT, INITIAL ENCOUNTER: ICD-10-CM

## 2024-07-10 DIAGNOSIS — S16.1XXA ACUTE CERVICAL MYOFASCIAL STRAIN, INITIAL ENCOUNTER: ICD-10-CM

## 2024-07-10 PROCEDURE — 99204 OFFICE O/P NEW MOD 45 MIN: CPT | Performed by: PHYSICAL MEDICINE & REHABILITATION

## 2024-07-10 RX ORDER — MELOXICAM 15 MG/1
15 TABLET ORAL DAILY
Qty: 14 TABLET | Refills: 0 | Status: SHIPPED | OUTPATIENT
Start: 2024-07-10 | End: 2024-07-24

## 2024-07-10 NOTE — PROGRESS NOTES
[facility]  NEW PATIENT EVALUATION      HISTORY OF PRESENT ILLNESS:     Chief Complaint   Patient presents with    Follow - Up     New right hand dominant patient presents for right hip pain. Pain has been present for 3 weeks. Patient states she was helping her  with a dryer, she states her  slipped and she held all the wait of the dryer. Pain 0/10. Takes cyclobenzaprine for pain with good relief. Patient states her pain is worse at night. No tx done. XR 6/25/24.        The patient is a 44 year old female with no significant past medical history who presents with right buttock and right neck and arm pain.   was used during the history portion of the exam today.  Patient was evaluated in the urgent care on 6/25/2024.  Patient states she was helping her  move her dryer when he slipped and the weight of the dryer was fully supported by her which aggravated her shoulder and hip pain.  Denies any pain at that time.  Pain is intermittent.  Pain is worse when she is trying to sleep at nighttime.  She notices that her arm goes numb when she sleeps on her side.  She also has pain in the buttock.  Denies any further radiating pain, any numbness tingling or weakness.  She had x-ray imaging completed which was negative for any acute abnormality.  She is taking cyclobenzaprine as needed.  Pain can be 8 out of 10 when she tries to wake up in the morning.  She denies any saddle anesthesia, any loss of bowel bladder control, any fevers or chills.    PHYSICAL EXAM:   LMP 06/08/2024 (Within Days)     Gait  Able to toe walk and heel walk without any difficulty    Full active range of motion of the cervical spine.  Strength is 5 out of 5 in bilateral upper extremities, sensation intact to light touch.  Reflexes +2 and symmetric.  Spurling's is negative.  Tenderness to palpation of the cervical paraspinals upper traps and levator scapulae.  Negative Neer's  and Jobes.    LUMBAR SPINE:  Inspection: no erythema, swelling, or obvious deformity.  Their iliac crest and shoulder heights are symmetrical.     Palpation: Non tender to palpation of the spinous process.  Palpation diffusely in the left lumbar paraspinals gluteus medius greater trochanter or SI joint  ROM: FAROM but pain with extension  Strength: 5/5 in bilateral lower extremities  Sensation: Intact to light touch in all dermatomes of the lower extremities  Reflexes: 2/4 at L4 and S1  Facet Loading: Positive bilateral lower lumbar facet joints  Straight leg raise: negative for radicular pain symptoms  Slump test: negative for pain symptoms for radicular pain symptoms      IMAGING:     X-ray hip and lumbar spine completed 6/25/2024 was personally reviewed which is negative for acute abnormality.    All imaging results were reviewed and discussed with patient.      ASSESSMENT/PLAN:     1. Acute myofascial strain of lumbar region, initial encounter    2. Shoulder strain, right, initial encounter    3. Acute cervical myofascial strain, initial encounter        Sarah Ying is a 44-year-old female presenting today for evaluation of neck and hip pain secondary to a strain injury.  I recommended starting PT program with home exercises and meloxicam 15 mg daily.  Recommend use ice and heat as tolerated.  Recommend follow-up in 4 weeks.  If pain is no better we may obtain further imaging.  I reviewed her x-ray imaging which is negative for an acute abnormality.      The patient verbalized understanding with the plan and was in agreement. All questions/concerns were addressed and there were no barriers to learning.  Please note Dragon dictation software was used to dictate this note and may result in inadvertent typos.    Teresa Ariza DO, FAAPMR & CAQSM  Physical Medicine and Rehabilitation  Sports and Spine Medicine    PAST MEDICAL HISTORY:     Past Medical History:    Allergic reaction    Unknown cause, ER visit  3/8/18.           PAST SURGICAL HISTORY:     Past Surgical History:   Procedure Laterality Date               Tubal ligation           CURRENT MEDICATIONS:     Current Outpatient Medications   Medication Sig Dispense Refill    cyclobenzaprine 10 MG Oral Tab Take 1 tablet (10 mg total) by mouth 2 (two) times daily as needed for Muscle spasms (may cause drowsiness no driving or operating machinery while taking this medication). 6 tablet 0    diclofenac 1 % External Gel Apply 4 g topically 4 (four) times daily. Apply to affected area(s). 60 g 2    ergocalciferol 1.25 MG (10966 UT) Oral Cap Take 1 capsule (50,000 Units total) by mouth once a week. (Patient not taking: Reported on 2024)      aspirin (ECOTRIN LOW STRENGTH) 81 MG Oral Tab EC Take 1 tablet (81 mg total) by mouth daily. (Patient not taking: Reported on 2024) 100 tablet 0    Psyllium 58.6 % Oral Powder Take 2 tablespoons in 6 oz of water at nightly. (Patient not taking: Reported on 2024) 660 g 7    Melatonin 3 MG Oral Tablet Dispersible Take 1 tablet by mouth nightly as needed. (Patient not taking: Reported on 2024) 100 tablet 1    Polyethylene Glycol 3350 (MIRALAX) 17 g Oral Powd Pack Take 17 g by mouth daily. (Patient not taking: Reported on 2023) 30 each 0    simethicone 125 MG Oral Chew Tab Chew 2 tablets (250 mg total) by mouth every 6 (six) hours as needed for FLATULENCE. (Patient not taking: Reported on 2023) 60 tablet 0         ALLERGIES:   No Known Allergies      FAMILY HISTORY:   History reviewed. No pertinent family history.       SOCIAL HISTORY:     Social History     Socioeconomic History    Marital status:    Tobacco Use    Smoking status: Never    Smokeless tobacco: Never   Vaping Use    Vaping status: Never Used   Substance and Sexual Activity    Alcohol use: No    Drug use: No          REVIEW OF SYSTEMS:   No patient-reported data collected this visit.      PHYSICAL EXAM:   General: No  immediate distress  Head: Normocephalic/ Atraumatic  Eyes: Extra-occular movements intact.   Ears: No auricular hematoma or deformities  Mouth: No lesions or ulcerations  Heart: peripheral pulses intact. Normal capillary refill.   Lungs: Non-labored respirations  Abdomen: No abdominal guarding  Extremities: No lower extremity edema bilaterally   Skin: No lesions noted   Cognition: alert & oriented x 3, attentive, able to follow 2 step commands, comprehention intact, spontaneous speech intact  Psychiatric: Mood and affect appropriate      LABS:     Lab Results   Component Value Date     (H) 06/06/2023    A1C 7.5 (H) 06/06/2023     Lab Results   Component Value Date    WBC 10.4 06/06/2023    RBC 5.05 06/06/2023    HGB 10.4 (L) 06/06/2023    HCT 35.5 06/06/2023    MCV 70.3 (L) 06/06/2023    MCH 20.6 (L) 06/06/2023    MCHC 29.3 (L) 06/06/2023    RDW 16.3 (H) 06/06/2023    .0 06/06/2023    MPV 9.1 01/23/2019     Lab Results   Component Value Date     (H) 06/06/2023    BUN 14 06/06/2023    BUNCREA 20.0 06/06/2023    CREATSERUM 0.70 06/06/2023    ANIONGAP 6 06/06/2023    GFRNAA 110 01/21/2021    GFRAA 127 01/21/2021    CA 8.7 06/06/2023    OSMOCALC 298 (H) 06/06/2023    ALKPHO 73 06/06/2023    AST 20 06/06/2023    ALT 40 06/06/2023    BILT 0.2 06/06/2023    TP 6.9 06/06/2023    ALB 3.2 (L) 06/06/2023    GLOBULIN 3.7 06/06/2023     06/06/2023    K 4.5 06/06/2023     06/06/2023    CO2 27.0 06/06/2023     No results found for: \"PTP\", \"PT\", \"INR\"  Lab Results   Component Value Date    VITD 19.8 (L) 06/06/2023

## 2024-08-04 ENCOUNTER — HOSPITAL ENCOUNTER (OUTPATIENT)
Age: 44
Discharge: HOME | End: 2024-08-04
Payer: COMMERCIAL

## 2024-08-04 VITALS
OXYGEN SATURATION: 100 % | HEART RATE: 69 BPM | TEMPERATURE: 97 F | DIASTOLIC BLOOD PRESSURE: 67 MMHG | RESPIRATION RATE: 18 BRPM | SYSTOLIC BLOOD PRESSURE: 128 MMHG

## 2024-08-04 DIAGNOSIS — R21 RASH AND OTHER NONSPECIFIC SKIN ERUPTION: Primary | ICD-10-CM

## 2024-08-04 RX ORDER — DIPHENHYDRAMINE HCL 25 MG
25 CAPSULE ORAL ONCE
Status: COMPLETED | OUTPATIENT
Start: 2024-08-04 | End: 2024-08-04

## 2024-08-04 RX ORDER — PREDNISONE 20 MG/1
60 TABLET ORAL ONCE
Status: COMPLETED | OUTPATIENT
Start: 2024-08-04 | End: 2024-08-04

## 2024-08-04 NOTE — DISCHARGE INSTRUCTIONS
Call the dermatologist tomorrow take the first available appointment with anybody in the office that can see you start taking over-the-counter Zyrtec to help with itchy skin and start the hydrocortisone cream to the rash apply the mupirocin on antibiotic ointment to the right forearm.  If you develop fevers or chills swelling the skin is red or hot to touch lip tongue or throat swelling chest pain or shortness of breath go to the nearest emergency department.      Llame al dermatólogo mañana, tome la primera randy disponible con alguien en el consultorio que pueda verlo, comience a nate Zyrtec de venta akiko para ayudar con la picazón en la piel y comience a aplicar la crema de hidrocortisona para la erupción, aplique mupirocina en ungüento antibiótico en el antebrazo derecho.  Si presenta fiebre o escalofríos, hinchazón, la piel está enrojecida o caliente al tocar el labio, lengua o garganta, hinchazón, dolor en el pecho o dificultad para respirar, acuda al servicio de urgencias más cercano.

## 2024-08-04 NOTE — ED INITIAL ASSESSMENT (HPI)
Pt presents to the IC with c/o an itchy rash to her legs, back, BUE. Pt has a spot on her right forearm that she has been scratching and it weeping. Hx of eczema.

## 2024-08-04 NOTE — ED PROVIDER NOTES
Patient Seen in: [facility]      History     Chief Complaint   Patient presents with    Rash Skin Problem     Stated Complaint: Derm Problem    Subjective:   HPI    This is a 44-year-old female with history of dermatitis intrinsic eczema presenting with a rash.  Via language line solutions  patient states that she has had a rash on her body that is very itchy for the past 5 days occasionally has taken over-the-counter antihistamine not tried any other over-the-counter medications.  No new lotions soaps or detergents.  No fevers or chills no lip tongue or throat swelling chest pain or shortness of breath but does have some drainage from one of the areas on her right arm.    Objective:   Past Medical History:    Allergic reaction    Unknown cause, ER visit 3/8/18.                Past Surgical History:   Procedure Laterality Date          2000     Tubal ligation                  Social History     Socioeconomic History    Marital status:    Tobacco Use    Smoking status: Never    Smokeless tobacco: Never   Vaping Use    Vaping status: Never Used   Substance and Sexual Activity    Alcohol use: No    Drug use: No              Review of Systems    Positive for stated Chief Complaint: Rash Skin Problem    Other systems are as noted in HPI.  Constitutional and vital signs reviewed.      All other systems reviewed and negative except as noted above.    Physical Exam     ED Triage Vitals [24 0929]   /67   Pulse 69   Resp 18   Temp 97.3 °F (36.3 °C)   Temp src Temporal   SpO2 100 %   O2 Device None (Room air)       Current Vitals:   Vital Signs  BP: 128/67  Pulse: 69  Resp: 18  Temp: 97.3 °F (36.3 °C)  Temp src: Temporal    Oxygen Therapy  SpO2: 100 %  O2 Device: None (Room air)            Physical Exam  Vitals and nursing note reviewed.   Constitutional:       Appearance: Normal appearance.   HENT:      Right Ear: External ear normal.      Left Ear: External ear  normal.      Nose: Nose normal.      Mouth/Throat:      Mouth: Mucous membranes are moist.      Pharynx: Oropharynx is clear.      Comments: No angioedema  Eyes:      Conjunctiva/sclera: Conjunctivae normal.   Cardiovascular:      Rate and Rhythm: Normal rate.   Pulmonary:      Effort: Pulmonary effort is normal.   Musculoskeletal:         General: Normal range of motion.      Cervical back: Normal range of motion.   Skin:     General: Skin is warm.      Capillary Refill: Capillary refill takes less than 2 seconds.      Findings: Rash present.             Comments: + Rash right forearm small amount of clear drainage no surrounding erythema no warmth no fluctuance or induration   Neurological:      Mental Status: She is alert and oriented to person, place, and time.             ED Course   Labs Reviewed - No data to display      MDM                 Medical Decision Making  44-year-old female well-appearing nontoxic presenting with a rash.  DDx contact dermatitis versus allergic reaction versus viral exanthem versus impetigo versus cellulitis.  No clinical indication for labs or imaging.  Physical exam is concerning for contact dermatitis this was discussed with the patient via language line solutions  also discussed cannot say exactly why or what is causing the rash could be something that she is used for years on the skin or something that came in contact with the skin from outside discussed one-time dose of oral prednisone and Benadryl here in the [facility] and will prescribe topical hydrocortisone cream Bactroban to apply to the rash on the right forearm as there is some drainage present.  Discussed over-the-counter Zyrtec to continue to help with itchy skin and following up with dermatology take the first available appointment to call tomorrow.  All education instructions including ER precautions placed in discharge paperwork.  Patient acknowledged understanding discharge instructions.    Problems  Addressed:  Rash and other nonspecific skin eruption: acute illness or injury    Risk  OTC drugs.  Prescription drug management.        Disposition and Plan     Clinical Impression:  1. Rash and other nonspecific skin eruption         Disposition:  Discharge  8/4/2024  9:39 am    Follow-up:  Cassie Chaney MD  [address]  [id number]  [place] [address]  [phone]    Call   Resource for dermatology call tomorrow take the first available appointment with anyone in the office          Medications Prescribed:  Discharge Medication List as of 8/4/2024  9:40 AM        START taking these medications    Details   hydrocortisone 2.5 % External Cream Apply 1 Application topically 2 (two) times daily., Normal, Disp-28 g, R-0      mupirocin 2 % External Ointment Apply 1 Application topically 3 (three) times daily for 5 days., Normal, Disp-1 each, R-0

## 2024-12-28 ENCOUNTER — TELEPHONE (OUTPATIENT)
Dept: FAMILY MEDICINE CLINIC | Facility: CLINIC | Age: 44
End: 2024-12-28

## 2024-12-28 ENCOUNTER — OFFICE VISIT (OUTPATIENT)
Dept: FAMILY MEDICINE CLINIC | Facility: CLINIC | Age: 44
End: 2024-12-28

## 2024-12-28 VITALS
DIASTOLIC BLOOD PRESSURE: 77 MMHG | SYSTOLIC BLOOD PRESSURE: 112 MMHG | OXYGEN SATURATION: 98 % | HEIGHT: 61 IN | BODY MASS INDEX: 33.42 KG/M2 | WEIGHT: 177 LBS | RESPIRATION RATE: 18 BRPM | HEART RATE: 79 BPM

## 2024-12-28 DIAGNOSIS — N76.0 ACUTE VAGINITIS: Primary | ICD-10-CM

## 2024-12-28 PROCEDURE — 3074F SYST BP LT 130 MM HG: CPT | Performed by: FAMILY MEDICINE

## 2024-12-28 PROCEDURE — 3078F DIAST BP <80 MM HG: CPT | Performed by: FAMILY MEDICINE

## 2024-12-28 PROCEDURE — 90656 IIV3 VACC NO PRSV 0.5 ML IM: CPT | Performed by: FAMILY MEDICINE

## 2024-12-28 PROCEDURE — 90471 IMMUNIZATION ADMIN: CPT | Performed by: FAMILY MEDICINE

## 2024-12-28 PROCEDURE — 99213 OFFICE O/P EST LOW 20 MIN: CPT | Performed by: FAMILY MEDICINE

## 2024-12-28 PROCEDURE — 3008F BODY MASS INDEX DOCD: CPT | Performed by: FAMILY MEDICINE

## 2024-12-28 RX ORDER — TERCONAZOLE 8 MG/G
1 CREAM VAGINAL NIGHTLY
Qty: 40 G | Refills: 0 | Status: SHIPPED | OUTPATIENT
Start: 2024-12-28 | End: 2025-01-04

## 2024-12-28 RX ORDER — FLUCONAZOLE 150 MG/1
150 TABLET ORAL DAILY
Qty: 3 TABLET | Refills: 0 | Status: SHIPPED | OUTPATIENT
Start: 2024-12-28 | End: 2024-12-31

## 2024-12-28 NOTE — TELEPHONE ENCOUNTER
Spoke to the pharmacist the 0.8% dosage is usually for 3 days and the 0.4% vaginal cream is used for 7 days. Please advise.     Message also routed to on call provider

## 2024-12-28 NOTE — TELEPHONE ENCOUNTER
, pharmacy wants clarification on prescription.    Attempted to call pharmacy and they are closed for a meal break until 1:30pm    .

## 2024-12-28 NOTE — TELEPHONE ENCOUNTER
Pharmacist at [facility]'s notified of Dr. Erickson's notes and verbalized understanding.

## 2024-12-28 NOTE — PROGRESS NOTES
2024  9:01 AM    Sarah Ying is a 44 year old female.    Chief complaint(s):   Chief Complaint   Patient presents with    Vaginal Problem     Pt. Complains of vaginal irritation on vaginal folds, discharge, dryness and itching.      HPI:     Sarah Ying primary complaint is regarding as above.     Patient is a 44-year-old female who presents complaining of vaginal pain and itchiness for the past week.  She reports having a white vaginal discharge which burns especially the labia and vagina area.  Denies any dysuria, vaginal bleeding or fever. No abdominal pains or back pain.       HISTORY:  Past Medical History:    Allergic reaction    Unknown cause, ER visit 3/8/18.        Past Surgical History:   Procedure Laterality Date               Tubal ligation        History reviewed. No pertinent family history.   Social History:   Social History     Socioeconomic History    Marital status:    Tobacco Use    Smoking status: Never    Smokeless tobacco: Never   Vaping Use    Vaping status: Never Used   Substance and Sexual Activity    Alcohol use: No    Drug use: No        Immunizations:   Immunization History   Administered Date(s) Administered    Covid-19 Vaccine Funky Moves (J&J) 0.5ml 2021    Covid-19 Vaccine Moderna Bivalent 50mcg/0.5mL 12+ years 2023    FLULAVAL 6 months & older 0.5 ml Prefilled syringe (38428) 2020, 10/29/2020, 2021    FLUZONE 6 months and older PFS 0.5 ml (69346) 10/05/2023    HEP B, Adult 2023    Influenza Vaccine, trivalent (IIV3), PF 0.5mL (27346) 2024    TDAP 2019    Varicella Vaccine 2023       Medications (Active prior to today's visit):  Current Outpatient Medications   Medication Sig Dispense Refill    Terconazole 0.8 % Vaginal Cream Place 1 applicator vaginally nightly for 7 days. 40 g 0    fluconazole (DIFLUCAN) 150 MG Oral Tab Take 1 tablet (150 mg total) by mouth daily for 3 doses. 3 tablet 0     hydrocortisone 2.5 % External Cream Apply 1 Application topically 2 (two) times daily. (Patient not taking: Reported on 12/28/2024) 28 g 0    cyclobenzaprine 10 MG Oral Tab Take 1 tablet (10 mg total) by mouth 2 (two) times daily as needed for Muscle spasms (may cause drowsiness no driving or operating machinery while taking this medication). (Patient not taking: Reported on 12/28/2024) 6 tablet 0    diclofenac 1 % External Gel Apply 4 g topically 4 (four) times daily. Apply to affected area(s). (Patient not taking: Reported on 12/28/2024) 60 g 2    ergocalciferol 1.25 MG (44899 UT) Oral Cap Take 1 capsule (50,000 Units total) by mouth once a week. (Patient not taking: Reported on 12/28/2024)      aspirin (ECOTRIN LOW STRENGTH) 81 MG Oral Tab EC Take 1 tablet (81 mg total) by mouth daily. (Patient not taking: Reported on 12/28/2024) 100 tablet 0    Psyllium 58.6 % Oral Powder Take 2 tablespoons in 6 oz of water at nightly. (Patient not taking: Reported on 12/28/2024) 660 g 7    Melatonin 3 MG Oral Tablet Dispersible Take 1 tablet by mouth nightly as needed. (Patient not taking: Reported on 12/28/2024) 100 tablet 1    Polyethylene Glycol 3350 (MIRALAX) 17 g Oral Powd Pack Take 17 g by mouth daily. (Patient not taking: Reported on 12/28/2024) 30 each 0    simethicone 125 MG Oral Chew Tab Chew 2 tablets (250 mg total) by mouth every 6 (six) hours as needed for FLATULENCE. (Patient not taking: Reported on 12/28/2024) 60 tablet 0       Allergies:  Allergies[1]      ROS:   Review of Systems   Constitutional:  Negative for appetite change and fever.   Eyes:  Negative for visual disturbance.   Respiratory:  Negative for shortness of breath.    Cardiovascular:  Negative for chest pain.   Gastrointestinal:  Negative for abdominal pain, nausea and vomiting.   Genitourinary:  Positive for dyspareunia, vaginal discharge (white) and vaginal pain. Negative for dysuria, hematuria, menstrual problem and vaginal bleeding.    Musculoskeletal:  Negative for back pain.   Skin:  Negative for rash.   Neurological:  Negative for dizziness and headaches.       PHYSICAL EXAM:   VS: /77   Pulse 79   Resp 18   Ht 5' 1\" (1.549 m)   Wt 177 lb (80.3 kg)   LMP 12/02/2024 (Within Days)   SpO2 98%   BMI 33.44 kg/m²     Physical Exam  Vitals reviewed.   Constitutional:       General: She is not in acute distress.     Appearance: Normal appearance.   HENT:      Head: Normocephalic.   Eyes:      Conjunctiva/sclera: Conjunctivae normal.   Cardiovascular:      Rate and Rhythm: Normal rate.   Pulmonary:      Effort: Pulmonary effort is normal.   Genitourinary:     Vagina: Vaginal discharge (white) and erythema present.   Musculoskeletal:      Cervical back: Neck supple.   Skin:     Findings: No rash.   Psychiatric:         Mood and Affect: Mood normal.         LABORATORY RESULTS:     EKG / Spirometry : -     Radiology: No results found.     ASSESSMENT/PLAN:   Assessment   Encounter Diagnosis   Name Primary?    Acute vaginitis Yes       MEDICATIONS:     Requested Prescriptions     Signed Prescriptions Disp Refills    Terconazole 0.8 % Vaginal Cream 40 g 0     Sig: Place 1 applicator vaginally nightly for 7 days.    fluconazole (DIFLUCAN) 150 MG Oral Tab 3 tablet 0     Sig: Take 1 tablet (150 mg total) by mouth daily for 3 doses.          LABORATORY & ORDERS:   Orders Placed This Encounter   Procedures    Fluzone trivalent vaccine, PF 0.5mL, 6mo+ (30849)    Chlamydia/Gc Amplification    Vaginitis Vaginosis PCR Panel   RECOMMENDATIONS given include: Patient was reassured of  her medical condition and all questions and concerns were answered. Patient was informed to please, call our office with any new or further questions or concerns that may come up in the near future. Notify Dr Erickson or the [facility] if there is a deterioration or worsening of the medical condition. Also, inform the doctor with any new symptoms or medications' side  effects.  No sex for 10 days    FOLLOW-UP: Schedule a follow-up visit in  prn.            Orders This Visit:  Orders Placed This Encounter   Procedures    Fluzone trivalent vaccine, PF 0.5mL, 6mo+ (90901)    Chlamydia/Gc Amplification    Vaginitis Vaginosis PCR Panel       Meds This Visit:  Requested Prescriptions     Signed Prescriptions Disp Refills    Terconazole 0.8 % Vaginal Cream 40 g 0     Sig: Place 1 applicator vaginally nightly for 7 days.    fluconazole (DIFLUCAN) 150 MG Oral Tab 3 tablet 0     Sig: Take 1 tablet (150 mg total) by mouth daily for 3 doses.       Imaging & Referrals:  INFLUENZA VACCINE, TRI, PRESERV FREE, 0.5 ML         WILLA AUGUSTIN MD         [1] No Known Allergies

## 2024-12-28 NOTE — TELEPHONE ENCOUNTER
Pharmacy called to clarify information on below medication.       Medication Quantity Refills Start End   Terconazole 0.8 % Vaginal Cream 40 g 0 12/28/2024 1/4/2025   Sig:   Place 1 applicator vaginally nightly for 7 days.     Route:   Vaginal     Order #:   [id number]

## 2024-12-29 LAB
BV BACTERIA DNA VAG QL NAA+PROBE: NEGATIVE
C GLABRATA DNA VAG QL NAA+PROBE: NEGATIVE
C KRUSEI DNA VAG QL NAA+PROBE: NEGATIVE
CANDIDA DNA VAG QL NAA+PROBE: POSITIVE
T VAGINALIS DNA VAG QL NAA+PROBE: NEGATIVE

## 2024-12-30 LAB
C TRACH DNA SPEC QL NAA+PROBE: NEGATIVE
N GONORRHOEA DNA SPEC QL NAA+PROBE: NEGATIVE

## 2025-01-30 RX ORDER — FLUCONAZOLE 150 MG/1
150 TABLET ORAL ONCE
Qty: 5 TABLET | Refills: 0 | Status: CANCELLED | OUTPATIENT
Start: 2025-01-30 | End: 2025-01-30

## 2025-01-30 RX ORDER — FLUCONAZOLE 150 MG/1
150 TABLET ORAL ONCE
COMMUNITY

## 2025-01-30 RX ORDER — FLUCONAZOLE 150 MG/1
150 TABLET ORAL DAILY
Qty: 5 TABLET | Refills: 0 | Status: SHIPPED | OUTPATIENT
Start: 2025-01-30

## 2025-04-15 ENCOUNTER — HOSPITAL ENCOUNTER (OUTPATIENT)
Age: 45
Discharge: HOME | End: 2025-04-15
Payer: COMMERCIAL

## 2025-04-15 VITALS
SYSTOLIC BLOOD PRESSURE: 126 MMHG | HEART RATE: 88 BPM | RESPIRATION RATE: 18 BRPM | OXYGEN SATURATION: 100 % | DIASTOLIC BLOOD PRESSURE: 86 MMHG | TEMPERATURE: 98 F

## 2025-04-15 DIAGNOSIS — R21 RASH: ICD-10-CM

## 2025-04-15 DIAGNOSIS — N64.4 BREAST PAIN IN FEMALE: Primary | ICD-10-CM

## 2025-04-15 PROCEDURE — 99213 OFFICE O/P EST LOW 20 MIN: CPT | Performed by: NURSE PRACTITIONER

## 2025-04-15 RX ORDER — HYDROCORTISONE 25 MG/G
1 CREAM TOPICAL 2 TIMES DAILY
Qty: 28 G | Refills: 0 | Status: SHIPPED | OUTPATIENT
Start: 2025-04-15

## 2025-04-15 NOTE — ED PROVIDER NOTES
Patient Seen in: [facility]      History     Chief Complaint   Patient presents with    Breast Pain    Mass     Stated Complaint: hard  lump on breast    Subjective:   HPI    This is a 44-year-old female presenting with pain and lumps in both breasts and rash on different parts of the body.  Patient is Croatian-speaking her daughter is at bedside providing history per patient request and states that she has had bilateral pain and feels like her breasts are hard for the past 5 days.  Per the patient she has had this before she did have a mammogram with no significant findings.  Denies any pus or drainage from the skin denies any redness or warmth body aches chills or fever.  Patient daughter states she also has a rash that is popped up on different parts of her body that is not currently itchy.  No new lotions soaps or detergents.  Patient daughter states that she did have a rash sometime ago did see the dermatologist but it was due to mites and received a topical for the entire body and that essentially resolved.  No lip tongue throat swelling chest pain or shortness of breath.    Objective:     No pertinent past medical history.            No pertinent past surgical history.              No pertinent social history.            Review of Systems    Positive for stated complaint: hard  lump on breast  Other systems are as noted in HPI.  Constitutional and vital signs reviewed.      All other systems reviewed and negative except as noted above.                  Physical Exam     ED Triage Vitals [04/15/25 1357]   /86   Pulse 88   Resp 18   Temp 98 °F (36.7 °C)   Temp src Oral   SpO2 100 %   O2 Device None (Room air)       Current Vitals:   Vital Signs  BP: 126/86  Pulse: 88  Resp: 18  Temp: 98 °F (36.7 °C)  Temp src: Oral    Oxygen Therapy  SpO2: 100 %  O2 Device: None (Room air)        Physical Exam  Vitals and nursing note reviewed.   Constitutional:       Appearance: Normal appearance.   HENT:       Right Ear: External ear normal.      Left Ear: External ear normal.      Nose: Nose normal.      Mouth/Throat:      Mouth: Mucous membranes are moist.      Pharynx: Oropharynx is clear.   Eyes:      Conjunctiva/sclera: Conjunctivae normal.   Cardiovascular:      Rate and Rhythm: Normal rate.   Pulmonary:      Effort: Pulmonary effort is normal.      Breath sounds: Normal breath sounds.   Chest:       Musculoskeletal:         General: Normal range of motion.      Cervical back: Normal range of motion.   Skin:     General: Skin is warm.      Capillary Refill: Capillary refill takes less than 2 seconds.      Findings: Rash present.          Neurological:      General: No focal deficit present.      Mental Status: She is alert and oriented to person, place, and time.                 ED Course   Labs Reviewed - No data to display                       MDM         Medical Decision Making  44-year-old female well-appearing nontoxic with breast pain and hard masses that she feels in her breast and a rash that is not itchy.  DDx contact dermatitis versus allergic reaction versus dry skin versus dry skin dermatitis versus mastalgia versus cyst of the breast versus a tumor of the breast.  No clinical indication for labs.  Discussed with patient and daughter at bedside that she would need to follow-up with primary care provider or the resource provided for breast specialist for an outpatient mammogram or ultrasound as that is not something available here in the [facility].  Discussed over-the-counter ibuprofen or Tylenol for pain discussed hydrocortisone cream that will be prescribed that she can apply to the rash although it is not itchy but it will be available to use if she needs it and recommended that she can follow-up with her dermatologist that she seen in the past recommend over-the-counter Claritin or Zyrtec to help with itchy skin.  Discussed signs and symptoms of infection and ER precautions.  Patient and family are  agreeable with the plan of care and acknowledged understanding discharge instructions.    Problems Addressed:  Breast pain in female: acute illness or injury  Rash: acute illness or injury    Risk  OTC drugs.  Prescription drug management.        Disposition and Plan     Clinical Impression:  1. Breast pain in female    2. Rash         Disposition:  Discharge  4/15/2025  2:19 pm    Follow-up:  Gideon Erickson MD  [address] [address]  [phone]    Schedule an appointment as soon as possible for a visit in 3 days      Shilpa Cordon MD  [address] [facility] [id number] [phone]      Resource to follow-up with for breast pain          Medications Prescribed:  Discharge Medication List as of 4/15/2025  2:19 PM        START taking these medications    Details   !! hydrocortisone 2.5 % External Cream Apply 1 Application topically 2 (two) times daily., Normal, Disp-28 g, R-0       !! - Potential duplicate medications found. Please discuss with provider.          Supplementary Documentation:

## 2025-04-15 NOTE — DISCHARGE INSTRUCTIONS
Take over-the-counter ibuprofen or Tylenol for pain.  Recommend you contact your primary care provider about the breast pain as he may want to order an outpatient mammogram or ultrasound of the breast you may also follow-up with the resource provided for breast pain.  Recommend you follow-up with the dermatologist for the rash.  If you are having itchy skin take over-the-counter Claritin or Zyrtec and start the hydrocortisone cream as needed all products you use on your skin should be hypoallergenic meaning no perfume or dye.    If all of a sudden you develop severe breast swelling the skin is red or hot to touch any discharge from the breasts body aches chills or fever or any new or worsening symptoms go to the nearest emergency department.      [place] ibuprofeno o Tylenol de venta akiko para el dolor. Le recomendamos que consulte con cabrera médico de cabecera sobre el dolor de mamas, ya que podría solicitar lili mamografía o lili ecografía mamaria ambulatoria. También puede consultar el recurso disponible para el dolor de mamas. Le recomendamos que consulte con el dermatólogo para el sarpullido. Si tiene picazón en la piel, tome Claritin o Zyrtec de venta akiko y comience la crema de hidrocortisona según sea necesario. Todos los productos que use en la piel deben ser hipoalergénicos, es decir, sin perfume ni colorantes.    Si de repente presenta inflamación severa de los senos, la piel está enrojecida o caliente al tacto, tiene secreción mamaria, emmanuel corporales, escalofríos o fiebre, o cualquier síntoma nuevo o que empeore, acuda al servicio de urgencias más cercano.

## 2025-04-15 NOTE — ED INITIAL ASSESSMENT (HPI)
Pt c/o pain + hardness to bilateral breast x 5 days denies secretion denies fever, pt also endorses itching + rash to different areas of her body

## 2025-04-21 RX ORDER — TERCONAZOLE 8 MG/G
CREAM VAGINAL NIGHTLY
COMMUNITY
End: 2025-04-21

## 2025-04-21 RX ORDER — TERCONAZOLE 8 MG/G
1 CREAM VAGINAL NIGHTLY
Qty: 20 G | Refills: 0 | Status: SHIPPED | OUTPATIENT
Start: 2025-04-21

## (undated) NOTE — LETTER
03/11/19        Katiana Barnhart  [address] [employer]      Dear Boni Cruz,    [facility] records indicate that you have outstanding lab work and or testing that was ordered for you and has not yet been completed:  Orders Placed This Encounter

## (undated) NOTE — LETTER
January 27, 2020     Elana Copping  [address]      Dear Charley Koenig:    Below are the results from your recent visit: The following results are within normal limits:  vit D.     Resulted Orders   VITAMIN D, 25-HYDROXY   Res

## (undated) NOTE — LETTER
07/17/20    Tomeka Taylor  [address] [facility]      Dear Ekaterina Gordon,    [address] [facility] records indicate that you have outstanding lab work and or testing that was ordered for you and has not yet been completed:  Orders Placed This Encounter

## (undated) NOTE — LETTER
08/24/20        Rose Mary Fees  [address] [facility]      Dear Misbah Paredes,    [facility] records indicate that you have outstanding lab work and or testing that was ordered for you and has not yet been completed:  Orders Placed This Encounter

## (undated) NOTE — ED AVS SNAPSHOT
Anselmo Bradley   MRN: [id number]    Department:  [facility] Emergency Department   Date of Visit:  6/2/2019           Disclosure     Insurance plans vary and the physician(s) referred by the ER may not be covered by your plan.  Please cont CARE PHYSICIAN AT ONCE OR RETURN IMMEDIATELY TO THE EMERGENCY DEPARTMENT. If you have been prescribed any medication(s), please fill your prescription right away and begin taking the medication(s) as directed.   If you believe that any of the medications